# Patient Record
Sex: MALE | Race: WHITE | NOT HISPANIC OR LATINO | Employment: FULL TIME | URBAN - METROPOLITAN AREA
[De-identification: names, ages, dates, MRNs, and addresses within clinical notes are randomized per-mention and may not be internally consistent; named-entity substitution may affect disease eponyms.]

---

## 2017-02-14 ENCOUNTER — LAB VISIT (OUTPATIENT)
Dept: LAB | Facility: HOSPITAL | Age: 52
End: 2017-02-14
Attending: FAMILY MEDICINE
Payer: COMMERCIAL

## 2017-02-14 DIAGNOSIS — R79.89 LOW TESTOSTERONE: ICD-10-CM

## 2017-02-14 DIAGNOSIS — E29.1 TESTICULAR HYPOFUNCTION: ICD-10-CM

## 2017-02-14 DIAGNOSIS — Z91.89 CARDIOVASCULAR RISK FACTOR: ICD-10-CM

## 2017-02-14 DIAGNOSIS — E78.5 HYPERLIPIDEMIA, UNSPECIFIED HYPERLIPIDEMIA TYPE: ICD-10-CM

## 2017-02-14 LAB
ALBUMIN SERPL BCP-MCNC: 3.7 G/DL
ALP SERPL-CCNC: 75 U/L
ALT SERPL W/O P-5'-P-CCNC: 38 U/L
ANION GAP SERPL CALC-SCNC: 11 MMOL/L
AST SERPL-CCNC: 37 U/L
BASOPHILS # BLD AUTO: 0.03 K/UL
BASOPHILS NFR BLD: 0.5 %
BILIRUB SERPL-MCNC: 1.1 MG/DL
BUN SERPL-MCNC: 21 MG/DL
CALCIUM SERPL-MCNC: 9.2 MG/DL
CHLORIDE SERPL-SCNC: 105 MMOL/L
CHOLEST/HDLC SERPL: 5.5 {RATIO}
CO2 SERPL-SCNC: 25 MMOL/L
CREAT SERPL-MCNC: 1.5 MG/DL
DIFFERENTIAL METHOD: ABNORMAL
EOSINOPHIL # BLD AUTO: 0.1 K/UL
EOSINOPHIL NFR BLD: 1.9 %
ERYTHROCYTE [DISTWIDTH] IN BLOOD BY AUTOMATED COUNT: 16.9 %
EST. GFR  (AFRICAN AMERICAN): >60 ML/MIN/1.73 M^2
EST. GFR  (NON AFRICAN AMERICAN): 52.8 ML/MIN/1.73 M^2
GLUCOSE SERPL-MCNC: 111 MG/DL
HCT VFR BLD AUTO: 48.9 %
HDL/CHOLESTEROL RATIO: 18 %
HDLC SERPL-MCNC: 172 MG/DL
HDLC SERPL-MCNC: 31 MG/DL
HGB BLD-MCNC: 15.6 G/DL
LDLC SERPL CALC-MCNC: 111 MG/DL
LYMPHOCYTES # BLD AUTO: 1.5 K/UL
LYMPHOCYTES NFR BLD: 24 %
MCH RBC QN AUTO: 21.5 PG
MCHC RBC AUTO-ENTMCNC: 31.9 %
MCV RBC AUTO: 68 FL
MONOCYTES # BLD AUTO: 0.6 K/UL
MONOCYTES NFR BLD: 8.7 %
NEUTROPHILS # BLD AUTO: 4.2 K/UL
NEUTROPHILS NFR BLD: 64.9 %
NONHDLC SERPL-MCNC: 141 MG/DL
PLATELET # BLD AUTO: 167 K/UL
PMV BLD AUTO: 10.1 FL
POTASSIUM SERPL-SCNC: 4.6 MMOL/L
PROT SERPL-MCNC: 6.7 G/DL
RBC # BLD AUTO: 7.24 M/UL
SODIUM SERPL-SCNC: 141 MMOL/L
TESTOST SERPL-MCNC: 656 NG/DL
TRIGL SERPL-MCNC: 150 MG/DL
WBC # BLD AUTO: 6.42 K/UL

## 2017-02-14 PROCEDURE — 80053 COMPREHEN METABOLIC PANEL: CPT

## 2017-02-14 PROCEDURE — 84403 ASSAY OF TOTAL TESTOSTERONE: CPT

## 2017-02-14 PROCEDURE — 85025 COMPLETE CBC W/AUTO DIFF WBC: CPT

## 2017-02-14 PROCEDURE — 36415 COLL VENOUS BLD VENIPUNCTURE: CPT | Mod: PO

## 2017-02-14 PROCEDURE — 80061 LIPID PANEL: CPT

## 2017-02-21 ENCOUNTER — OFFICE VISIT (OUTPATIENT)
Dept: FAMILY MEDICINE | Facility: CLINIC | Age: 52
End: 2017-02-21
Payer: COMMERCIAL

## 2017-02-21 VITALS
DIASTOLIC BLOOD PRESSURE: 85 MMHG | HEART RATE: 78 BPM | BODY MASS INDEX: 35.03 KG/M2 | HEIGHT: 71 IN | WEIGHT: 250.25 LBS | SYSTOLIC BLOOD PRESSURE: 141 MMHG

## 2017-02-21 DIAGNOSIS — E29.1 TESTICULAR HYPOFUNCTION: ICD-10-CM

## 2017-02-21 DIAGNOSIS — Z76.89 ESTABLISHING CARE WITH NEW DOCTOR, ENCOUNTER FOR: Primary | ICD-10-CM

## 2017-02-21 DIAGNOSIS — E78.5 HYPERLIPIDEMIA, UNSPECIFIED HYPERLIPIDEMIA TYPE: ICD-10-CM

## 2017-02-21 PROCEDURE — 3077F SYST BP >= 140 MM HG: CPT | Mod: S$GLB,,, | Performed by: FAMILY MEDICINE

## 2017-02-21 PROCEDURE — 99999 PR PBB SHADOW E&M-EST. PATIENT-LVL III: CPT | Mod: PBBFAC,,, | Performed by: FAMILY MEDICINE

## 2017-02-21 PROCEDURE — 99213 OFFICE O/P EST LOW 20 MIN: CPT | Mod: S$GLB,,, | Performed by: FAMILY MEDICINE

## 2017-02-21 PROCEDURE — 3079F DIAST BP 80-89 MM HG: CPT | Mod: S$GLB,,, | Performed by: FAMILY MEDICINE

## 2017-02-21 RX ORDER — TESTOSTERONE CYPIONATE 200 MG/ML
INJECTION, SOLUTION INTRAMUSCULAR
Qty: 10 ML | Refills: 4 | Status: SHIPPED | OUTPATIENT
Start: 2017-02-21 | End: 2017-05-22 | Stop reason: SDUPTHER

## 2017-02-21 NOTE — PATIENT INSTRUCTIONS
Weight Management: Getting Started  Healthy bodies come in all shapes and sizes. Not all bodies are made to be thin. For some people, a healthy weight is higher than the average weight listed on weight charts. Your healthcare provider can help you decide on a healthy weight for you.    Reasons to lose weight  Losing weight can help with some health problems, such as high blood pressure, heart disease, diabetes, sleep apnea, and arthritis. You may also feel more energy.  Set your long-term goal  Your goal doesn't even have to be a specific weight. You may decide on a fitness goal (such as being able to walk 10 miles a week), or a health goal (such as lowering your blood pressure). Choose a goal that is measurable and reasonable, so you know when you've reached it. A goal of reaching a BMI of less than 25 is not always reasonable (or possible).   Make an action plan  Habits dont change overnight. Setting your goals too high can leave you feeling discouraged if you cant reach them. Be realistic. Choose one or two small changes you can make now. Set an action plan for how you are going to make these changes. When you can stick to this plan, keep making a few more small changes. Taking small steps will help you stay on the path to success.  Track your progress  Write down your goals. Then, keep a daily record of your progress. Write down what you eat and how active you are. This record lets you look back on how much youve done. It may also help when youre feeling frustrated. Reward yourself for success. Even if you dont reach every goal, give yourself credit for what you do get done.  Get support  Encouragement from others can help make losing weight easier. Ask your family members and friends for support. They may even want to join you. Also look to your healthcare provider, registered dietitian, and  for help. Your local hospital can give you more information about nutrition, exercise, and  weight loss.  Date Last Reviewed: 1/31/2016  © 0009-4377 [employer], [email or web address] [address] [place] [address], [place] [address]. All rights reserved. This information is not intended as a substitute for professional medical care. Always follow your healthcare professional's instructions.

## 2017-02-21 NOTE — MEDICAL/APP STUDENT
Subjective:       Patient ID: Enrico Talbot is a 51 y.o. male.    Chief Complaint: Establish Care    HPI     Establish Care  Pt reports to the clinic to establish care.   The pt has a medical history which includes, hyperlipidemia, low testerone, family history of CAD  As far as smoking is concerned, the pt is non smoker.  Patient abstains from alcohol.  The pt attempts to maintain a healthy diet and engages in regular exercise.   Consistent seatbelt usage reported.   Pt has no symptoms of depression.   Health maintenance addressed and updated in chart.    Patient follows up every 3 months for testosterone levels    Most recent colonoscopy in 2015. NAD   Last Tdap in 2009      Review of Systems   Constitutional: Negative for chills and fever.   HENT: Negative for congestion and sore throat.    Eyes: Negative for visual disturbance.   Respiratory: Negative for chest tightness and shortness of breath.    Cardiovascular: Negative for chest pain and leg swelling.   Gastrointestinal: Negative for abdominal pain and blood in stool.   Genitourinary: Negative for difficulty urinating, dysuria and hematuria.   Musculoskeletal: Positive for arthralgias. Negative for back pain.   Neurological: Negative for dizziness, light-headedness and headaches.       Objective:      Physical Exam   Constitutional: He is oriented to person, place, and time. He appears well-developed and well-nourished.   HENT:   Mouth/Throat: Oropharynx is clear and moist.   Eyes: EOM are normal. Pupils are equal, round, and reactive to light.   Neck: No thyromegaly present.   Cardiovascular: Regular rhythm and normal heart sounds.    Pulmonary/Chest: Effort normal and breath sounds normal.   Abdominal: Soft. Bowel sounds are normal.   Lymphadenopathy:     He has no cervical adenopathy.   Neurological: He is alert and oriented to person, place, and time. He has normal reflexes.   Skin: Skin is warm and dry.       Assessment:     Enrico Talbot is  a 52 yo male presenting to establish care.    Plan:     1. Establishing care with new doctor, encounter for    2. Testicular hypofunction  - Testosterone cypionate (DEPOTESTOTERONE CYPIONATE) 200 mg/mL injection; Inject 0.35 mL IM twice weekly.  Dispense: 10 mL; Refill: 4  - Provided with ambulatory referral to Endocrinology     3. Hyperlipidemia, unspecified hyperlipidemia type  - Advised to continue fish oil-omega-3 fatty acids 300-1,000 mg capsule Take 4 g by mouth once daily    4. BMI 34.0-34.9,adult  - Counseled on the importance of a balanced diet and regular exercise

## 2017-02-21 NOTE — PROGRESS NOTES
Subjective:        Patient ID: Enrico Talbot is a 51 y.o. male.     Chief Complaint: Establish Care     HPI      Establish Care  Pt reports to the clinic to establish care.   The pt has a medical history which includes, hyperlipidemia, low testerone, family history of CAD  As far as smoking is concerned, the pt is non smoker.  Patient abstains from alcohol.  The pt attempts to maintain a healthy diet and engages in regular exercise.   Consistent seatbelt usage reported.   Pt has no symptoms of depression.   Health maintenance addressed and updated in chart.     Patient follows up every 3 months for testosterone levels     Most recent colonoscopy in 2015. NAD   Last Tdap in 2009        Review of Systems   Constitutional: Negative for chills and fever.   HENT: Negative for congestion and sore throat.   Eyes: Negative for visual disturbance.   Respiratory: Negative for chest tightness and shortness of breath.   Cardiovascular: Negative for chest pain and leg swelling.   Gastrointestinal: Negative for abdominal pain and blood in stool.   Genitourinary: Negative for difficulty urinating, dysuria and hematuria.   Musculoskeletal: Positive for arthralgias. Negative for back pain.   Neurological: Negative for dizziness, light-headedness and headaches.       Objective:      Physical Exam   Constitutional: He is oriented to person, place, and time. He appears well-developed and well-nourished.   HENT:   Mouth/Throat: Oropharynx is clear and moist.   Eyes: EOM are normal. Pupils are equal, round, and reactive to light.   Neck: No thyromegaly present.   Cardiovascular: Regular rhythm and normal heart sounds.   Pulmonary/Chest: Effort normal and breath sounds normal.   Abdominal: Soft. Bowel sounds are normal.   Lymphadenopathy:   He has no cervical adenopathy.   Neurological: He is alert and oriented to person, place, and time. He has normal reflexes.   Skin: Skin is warm and dry.       Assessment:      Enrico  Antione is a 50 yo male presenting to establish care.     Plan:      1. Establishing care with new doctor, encounter for     2. Testicular hypofunction  - Testosterone cypionate (DEPOTESTOTERONE CYPIONATE) 200 mg/mL injection; Inject 0.35 mL IM twice weekly. Dispense: 10 mL; Refill: 4  - Provided with ambulatory referral to Endocrinology      3. Hyperlipidemia, unspecified hyperlipidemia type  - Advised to continue fish oil-omega-3 fatty acids 300-1,000 mg capsule Take 4 g by mouth once daily     4. BMI 34.0-34.9,adult  - Counseled on the importance of a balanced diet and regular exercise      Portions of this note were created using Dragon voice recognition software. There may be voice recognition errors found in the text, and attempts were made to correct these errors prior to signature    Fidel Phelan MD    Family Medicine  2/21/2017

## 2017-05-22 ENCOUNTER — OFFICE VISIT (OUTPATIENT)
Dept: FAMILY MEDICINE | Facility: CLINIC | Age: 52
End: 2017-05-22
Payer: COMMERCIAL

## 2017-05-22 VITALS
BODY MASS INDEX: 34.48 KG/M2 | SYSTOLIC BLOOD PRESSURE: 136 MMHG | HEART RATE: 73 BPM | HEIGHT: 71 IN | DIASTOLIC BLOOD PRESSURE: 92 MMHG | WEIGHT: 246.25 LBS

## 2017-05-22 DIAGNOSIS — E29.1 TESTICULAR HYPOFUNCTION: ICD-10-CM

## 2017-05-22 DIAGNOSIS — E78.5 HYPERLIPIDEMIA, UNSPECIFIED HYPERLIPIDEMIA TYPE: Primary | ICD-10-CM

## 2017-05-22 PROCEDURE — 3080F DIAST BP >= 90 MM HG: CPT | Mod: S$GLB,,, | Performed by: FAMILY MEDICINE

## 2017-05-22 PROCEDURE — 1160F RVW MEDS BY RX/DR IN RCRD: CPT | Mod: S$GLB,,, | Performed by: FAMILY MEDICINE

## 2017-05-22 PROCEDURE — 99213 OFFICE O/P EST LOW 20 MIN: CPT | Mod: S$GLB,,, | Performed by: FAMILY MEDICINE

## 2017-05-22 PROCEDURE — 99999 PR PBB SHADOW E&M-EST. PATIENT-LVL III: CPT | Mod: PBBFAC,,, | Performed by: FAMILY MEDICINE

## 2017-05-22 PROCEDURE — 3075F SYST BP GE 130 - 139MM HG: CPT | Mod: S$GLB,,, | Performed by: FAMILY MEDICINE

## 2017-05-22 RX ORDER — TESTOSTERONE CYPIONATE 200 MG/ML
INJECTION, SOLUTION INTRAMUSCULAR
Qty: 10 ML | Refills: 4 | Status: SHIPPED | OUTPATIENT
Start: 2017-05-22 | End: 2017-08-22 | Stop reason: SDUPTHER

## 2017-05-22 NOTE — PROGRESS NOTES
Subjective:       Patient ID: Enrico Talbot is a 52 y.o. male.    Chief Complaint: Follow-up (3month)    HPI     Follow up  Pt is here to follow up multiple chronic medical conditions.   Pt does reports compliance with medications.   The pt has a current PMH of HLD.  As it relates to exercise, the pt reports that he has been exercising.  As far as smoking is concerned, the pt denies.  Pt has been making attempts at eating healthy.  Health maintenance addressed and updated in chart.    Review of Systems   Constitutional: Negative for chills and fever.   Respiratory: Negative for chest tightness and shortness of breath.    Cardiovascular: Negative for chest pain and leg swelling.   Gastrointestinal: Negative for abdominal pain, constipation, diarrhea and vomiting.   Genitourinary: Negative for decreased urine volume, dysuria and hematuria.   Musculoskeletal: Negative for arthralgias.   Neurological: Negative for weakness and light-headedness.       Objective:      Physical Exam   Constitutional: He appears well-developed and well-nourished. No distress.   HENT:   Head: Normocephalic and atraumatic.   Mouth/Throat: Oropharynx is clear and moist. No oropharyngeal exudate.   Eyes: EOM are normal. Pupils are equal, round, and reactive to light.   Neck: Normal range of motion. Neck supple. No thyromegaly present.   Cardiovascular: Normal rate, regular rhythm, normal heart sounds and intact distal pulses.    Pulmonary/Chest: Effort normal and breath sounds normal. No respiratory distress. He has no wheezes.   Abdominal: Soft. Bowel sounds are normal. He exhibits no distension and no mass. There is no tenderness.   Musculoskeletal: He exhibits no edema.   Lymphadenopathy:     He has no cervical adenopathy.   Neurological: He is alert.   Skin: Skin is warm. No rash noted. No erythema.   Psychiatric: He has a normal mood and affect. His behavior is normal.   Vitals reviewed.      Assessment:       1. Hyperlipidemia,  unspecified hyperlipidemia type    2. BMI 34.0-34.9,adult        Plan:       1. Hyperlipidemia, unspecified hyperlipidemia type  Consider starting lipitor at next visit.   Pt will continue on CoQ 10.    2. BMI 34.0-34.9,adult  Patient has been advised to continue to maintain a healthy lifestyle, including regular exercise and consuming a well balanced diet.     Patient readiness: acceptance and barriers:none    During the course of the visit the patient was educated and counseled about the following:     Obesity:   Informal exercise measures discussed, e.g. taking stairs instead of elevator.  Regular aerobic exercise program discussed.    Goals: Obesity: Reduce calorie intake and BMI    Did patient meet goals/outcomes: No    The following self management tools provided: excercise log    Patient Instructions (the written plan) was given to the patient/family.     Time spent with patient: 15 minutes    Portions of this note were created using Dragon voice recognition software. There may be voice recognition errors found in the text, and attempts were made to correct these errors prior to signature    Fidel Phelan MD    Family Medicine  5/22/2017

## 2017-05-22 NOTE — PATIENT INSTRUCTIONS
Weight Management: Getting Started  Healthy bodies come in all shapes and sizes. Not all bodies are made to be thin. For some people, a healthy weight is higher than the average weight listed on weight charts. Your healthcare provider can help you decide on a healthy weight for you.    Reasons to lose weight  Losing weight can help with some health problems, such as high blood pressure, heart disease, diabetes, sleep apnea, and arthritis. You may also feel more energy.  Set your long-term goal  Your goal doesn't even have to be a specific weight. You may decide on a fitness goal (such as being able to walk 10 miles a week), or a health goal (such as lowering your blood pressure). Choose a goal that is measurable and reasonable, so you know when you've reached it. A goal of reaching a BMI of less than 25 is not always reasonable (or possible).   Make an action plan  Habits dont change overnight. Setting your goals too high can leave you feeling discouraged if you cant reach them. Be realistic. Choose one or two small changes you can make now. Set an action plan for how you are going to make these changes. When you can stick to this plan, keep making a few more small changes. Taking small steps will help you stay on the path to success.  Track your progress  Write down your goals. Then, keep a daily record of your progress. Write down what you eat and how active you are. This record lets you look back on how much youve done. It may also help when youre feeling frustrated. Reward yourself for success. Even if you dont reach every goal, give yourself credit for what you do get done.  Get support  Encouragement from others can help make losing weight easier. Ask your family members and friends for support. They may even want to join you. Also look to your healthcare provider, registered dietitian, and  for help. Your local hospital can give you more information about nutrition, exercise, and  weight loss.  Date Last Reviewed: 1/31/2016  © 4820-3588 [employer], [employer] [address] [place] [address], [place] [address]. All rights reserved. This information is not intended as a substitute for professional medical care. Always follow your healthcare professional's instructions.

## 2017-08-22 ENCOUNTER — OFFICE VISIT (OUTPATIENT)
Dept: FAMILY MEDICINE | Facility: CLINIC | Age: 52
End: 2017-08-22
Payer: COMMERCIAL

## 2017-08-22 ENCOUNTER — DOCUMENTATION ONLY (OUTPATIENT)
Dept: FAMILY MEDICINE | Facility: CLINIC | Age: 52
End: 2017-08-22

## 2017-08-22 VITALS
HEART RATE: 79 BPM | DIASTOLIC BLOOD PRESSURE: 81 MMHG | WEIGHT: 244.94 LBS | BODY MASS INDEX: 34.29 KG/M2 | HEIGHT: 71 IN | SYSTOLIC BLOOD PRESSURE: 137 MMHG

## 2017-08-22 DIAGNOSIS — E78.5 HYPERLIPIDEMIA, UNSPECIFIED HYPERLIPIDEMIA TYPE: Primary | ICD-10-CM

## 2017-08-22 DIAGNOSIS — R79.89 LOW TESTOSTERONE: ICD-10-CM

## 2017-08-22 DIAGNOSIS — E29.1 TESTICULAR HYPOFUNCTION: ICD-10-CM

## 2017-08-22 PROCEDURE — 3079F DIAST BP 80-89 MM HG: CPT | Mod: S$GLB,,, | Performed by: FAMILY MEDICINE

## 2017-08-22 PROCEDURE — 3008F BODY MASS INDEX DOCD: CPT | Mod: S$GLB,,, | Performed by: FAMILY MEDICINE

## 2017-08-22 PROCEDURE — 3075F SYST BP GE 130 - 139MM HG: CPT | Mod: S$GLB,,, | Performed by: FAMILY MEDICINE

## 2017-08-22 PROCEDURE — 99999 PR PBB SHADOW E&M-EST. PATIENT-LVL III: CPT | Mod: PBBFAC,,, | Performed by: FAMILY MEDICINE

## 2017-08-22 PROCEDURE — 99214 OFFICE O/P EST MOD 30 MIN: CPT | Mod: S$GLB,,, | Performed by: FAMILY MEDICINE

## 2017-08-22 RX ORDER — TESTOSTERONE CYPIONATE 200 MG/ML
INJECTION, SOLUTION INTRAMUSCULAR
Qty: 10 ML | Refills: 4 | Status: SHIPPED | OUTPATIENT
Start: 2017-08-22 | End: 2018-02-23 | Stop reason: SDUPTHER

## 2017-08-22 NOTE — PATIENT INSTRUCTIONS
Weight Management: Getting Started  Healthy bodies come in all shapes and sizes. Not all bodies are made to be thin. For some people, a healthy weight is higher than the average weight listed on weight charts. Your healthcare provider can help you decide on a healthy weight for you.    Reasons to lose weight  Losing weight can help with some health problems, such as high blood pressure, heart disease, diabetes, sleep apnea, and arthritis. You may also feel more energy.  Set your long-term goal  Your goal doesn't even have to be a specific weight. You may decide on a fitness goal (such as being able to walk 10 miles a week), or a health goal (such as lowering your blood pressure). Choose a goal that is measurable and reasonable, so you know when you've reached it. A goal of reaching a BMI of less than 25 is not always reasonable (or possible).   Make an action plan  Habits dont change overnight. Setting your goals too high can leave you feeling discouraged if you cant reach them. Be realistic. Choose one or two small changes you can make now. Set an action plan for how you are going to make these changes. When you can stick to this plan, keep making a few more small changes. Taking small steps will help you stay on the path to success.  Track your progress  Write down your goals. Then, keep a daily record of your progress. Write down what you eat and how active you are. This record lets you look back on how much youve done. It may also help when youre feeling frustrated. Reward yourself for success. Even if you dont reach every goal, give yourself credit for what you do get done.  Get support  Encouragement from others can help make losing weight easier. Ask your family members and friends for support. They may even want to join you. Also look to your healthcare provider, registered dietitian, and  for help. Your local hospital can give you more information about nutrition, exercise, and  weight loss.  Date Last Reviewed: 1/31/2016  © 7543-2692 [employer], [employer]. [address] [place] [address], [place] [address]. All rights reserved. This information is not intended as a substitute for professional medical care. Always follow your healthcare professional's instructions.

## 2017-08-22 NOTE — PROGRESS NOTES
Subjective:       Patient ID: Enrico Talbot is a 52 y.o. male.    Chief Complaint: Follow-up    HPI     Follow up  Pt is here to follow up multiple chronic medical conditions.   Pt does reports compliance with medications.   The pt has a current PMH of HLD and low T..  As it relates to exercise, the pt reports that he routinely exercises.   As far as smoking is concerned, the pt denies.   Pt has been making attempts at eating healthy.  Health maintenance addressed and updated in chart.    Review of Systems   Constitutional: Negative for activity change, chills, fever and unexpected weight change.   HENT: Negative for hearing loss, rhinorrhea and trouble swallowing.    Eyes: Negative for discharge and visual disturbance.   Respiratory: Negative for chest tightness, shortness of breath and wheezing.    Cardiovascular: Negative for chest pain, palpitations and leg swelling.   Gastrointestinal: Negative for abdominal pain, blood in stool, constipation, diarrhea and vomiting.   Endocrine: Negative for polydipsia and polyuria.   Genitourinary: Negative for decreased urine volume, difficulty urinating, dysuria, hematuria and urgency.   Musculoskeletal: Negative for arthralgias, joint swelling and neck pain.   Neurological: Negative for weakness, light-headedness and headaches.   Psychiatric/Behavioral: Negative for confusion and dysphoric mood.       Objective:      Physical Exam   Constitutional: He appears well-developed and well-nourished. No distress.   HENT:   Head: Normocephalic and atraumatic.   Mouth/Throat: Oropharynx is clear and moist. No oropharyngeal exudate.   Eyes: EOM are normal. Pupils are equal, round, and reactive to light.   Neck: Normal range of motion. Neck supple. No thyromegaly present.   Cardiovascular: Normal rate, regular rhythm, normal heart sounds and intact distal pulses.    Pulmonary/Chest: Effort normal and breath sounds normal. No respiratory distress. He has no wheezes.    Abdominal: Soft. Bowel sounds are normal. He exhibits no distension and no mass. There is no tenderness.   Musculoskeletal: He exhibits no edema.   Lymphadenopathy:     He has no cervical adenopathy.   Neurological: He is alert.   Skin: Skin is warm. No rash noted. No erythema.   Psychiatric: He has a normal mood and affect. His behavior is normal.   Vitals reviewed.      Assessment:       1. Hyperlipidemia, unspecified hyperlipidemia type    2. Testicular hypofunction    3. Low testosterone    4. BMI 34.0-34.9,adult        Plan:       1. Testicular hypofunction  - testosterone cypionate (DEPOTESTOTERONE CYPIONATE) 200 mg/mL injection; Inject 0.35 mL IM twice weekly.  Dispense: 10 mL; Refill: 4    2. Hyperlipidemia, unspecified hyperlipidemia type  The 10-year ASCVD risk score (Janene STOREY Jr., et al., 2013) is: 6.8%    Values used to calculate the score:      Age: 52 years      Sex: Male      Is Non- : No      Diabetic: No      Tobacco smoker: No      Systolic Blood Pressure: 137 mmHg      Is BP treated: No      HDL Cholesterol: 31 mg/dL      Total Cholesterol: 172 mg/dL    3. Low testosterone  Continue replacement.   Pt understands risks associated with meds.     4. BMI 34.0-34.9,adult  Patient has been advised to continue to maintain a healthy lifestyle, including regular exercise and consuming a well balanced diet.     Patient readiness: acceptance and barriers:none    During the course of the visit the patient was educated and counseled about the following:     Obesity:   Informal exercise measures discussed, e.g. taking stairs instead of elevator.  Regular aerobic exercise program discussed.    Goals: Obesity: Reduce calorie intake and BMI    Did patient meet goals/outcomes: no    The following self management tools provided: excercise log    Patient Instructions (the written plan) was given to the patient/family.     Time spent with patient: 15 minutes    Portions of this note were  created using Dragon voice recognition software. There may be voice recognition errors found in the text, and attempts were made to correct these errors prior to signature    Fidel Phelan MD    Family Medicine  8/22/2017

## 2017-08-22 NOTE — PROGRESS NOTES
Pre-Visit Chart Review  For Appointment Scheduled on (8/22)    Health Maintenance Due   Topic Date Due    Influenza Vaccine  08/01/2017

## 2018-02-23 ENCOUNTER — DOCUMENTATION ONLY (OUTPATIENT)
Dept: FAMILY MEDICINE | Facility: CLINIC | Age: 53
End: 2018-02-23

## 2018-02-23 ENCOUNTER — LAB VISIT (OUTPATIENT)
Dept: LAB | Facility: HOSPITAL | Age: 53
End: 2018-02-23
Attending: FAMILY MEDICINE
Payer: COMMERCIAL

## 2018-02-23 ENCOUNTER — OFFICE VISIT (OUTPATIENT)
Dept: FAMILY MEDICINE | Facility: CLINIC | Age: 53
End: 2018-02-23
Payer: COMMERCIAL

## 2018-02-23 VITALS
OXYGEN SATURATION: 99 % | BODY MASS INDEX: 34.01 KG/M2 | HEIGHT: 71 IN | HEART RATE: 66 BPM | SYSTOLIC BLOOD PRESSURE: 120 MMHG | DIASTOLIC BLOOD PRESSURE: 82 MMHG | WEIGHT: 242.94 LBS

## 2018-02-23 DIAGNOSIS — E78.5 HYPERLIPIDEMIA, UNSPECIFIED HYPERLIPIDEMIA TYPE: ICD-10-CM

## 2018-02-23 DIAGNOSIS — R79.89 LOW TESTOSTERONE: ICD-10-CM

## 2018-02-23 DIAGNOSIS — E29.1 TESTICULAR HYPOFUNCTION: ICD-10-CM

## 2018-02-23 DIAGNOSIS — Z00.00 WELLNESS EXAMINATION: ICD-10-CM

## 2018-02-23 DIAGNOSIS — Z00.00 WELLNESS EXAMINATION: Primary | ICD-10-CM

## 2018-02-23 LAB
ANION GAP SERPL CALC-SCNC: 8 MMOL/L
BUN SERPL-MCNC: 18 MG/DL
CALCIUM SERPL-MCNC: 9.2 MG/DL
CHLORIDE SERPL-SCNC: 103 MMOL/L
CHOLEST SERPL-MCNC: 142 MG/DL
CHOLEST/HDLC SERPL: 4.9 {RATIO}
CO2 SERPL-SCNC: 28 MMOL/L
CREAT SERPL-MCNC: 1.3 MG/DL
EST. GFR  (AFRICAN AMERICAN): >60 ML/MIN/1.73 M^2
EST. GFR  (NON AFRICAN AMERICAN): >60 ML/MIN/1.73 M^2
GLUCOSE SERPL-MCNC: 103 MG/DL
HDLC SERPL-MCNC: 29 MG/DL
HDLC SERPL: 20.4 %
LDLC SERPL CALC-MCNC: 93 MG/DL
NONHDLC SERPL-MCNC: 113 MG/DL
POTASSIUM SERPL-SCNC: 4.4 MMOL/L
PROSTATE SPECIFIC ANTIGEN, TOTAL: 0.78 NG/ML
PSA FREE MFR SERPL: 35.9 %
PSA FREE SERPL-MCNC: 0.28 NG/ML
SODIUM SERPL-SCNC: 139 MMOL/L
TESTOST SERPL-MCNC: 991 NG/DL
TRIGL SERPL-MCNC: 100 MG/DL

## 2018-02-23 PROCEDURE — 84154 ASSAY OF PSA FREE: CPT

## 2018-02-23 PROCEDURE — 84403 ASSAY OF TOTAL TESTOSTERONE: CPT

## 2018-02-23 PROCEDURE — 80048 BASIC METABOLIC PNL TOTAL CA: CPT

## 2018-02-23 PROCEDURE — 36415 COLL VENOUS BLD VENIPUNCTURE: CPT | Mod: PO

## 2018-02-23 PROCEDURE — 99999 PR PBB SHADOW E&M-EST. PATIENT-LVL III: CPT | Mod: PBBFAC,,, | Performed by: FAMILY MEDICINE

## 2018-02-23 PROCEDURE — 80061 LIPID PANEL: CPT

## 2018-02-23 PROCEDURE — 99396 PREV VISIT EST AGE 40-64: CPT | Mod: S$GLB,,, | Performed by: FAMILY MEDICINE

## 2018-02-23 RX ORDER — TESTOSTERONE CYPIONATE 200 MG/ML
INJECTION, SOLUTION INTRAMUSCULAR
Qty: 10 ML | Refills: 4 | Status: SHIPPED | OUTPATIENT
Start: 2018-02-23 | End: 2021-02-08

## 2018-02-23 NOTE — PROGRESS NOTES
Pre-Visit Chart Review  For Appointment Scheduled on (2/23    Health Maintenance Due   Topic Date Due    Influenza Vaccine  08/01/2017

## 2018-02-23 NOTE — PROGRESS NOTES
Subjective:       Patient ID: Enrico Talbot is a 52 y.o. male.    Chief Complaint: Annual Exam    HPI     Annual Exam  Pt reports to the clinic for a wellness exam.   Currently, pt is without complaint.   The pt has a medical history which includes HLD and low T.  As far as smoking is concerned, the pt denies.   The pt attempts to maintain a healthy diet and engages in regular exercise.   Consistent seatbelt usage reported.   Pt has no symptoms of depression.     Review of Systems   Constitutional: Negative for chills and fever.   Respiratory: Negative for chest tightness and shortness of breath.    Cardiovascular: Negative for chest pain and leg swelling.   Gastrointestinal: Negative for abdominal pain, constipation, diarrhea and vomiting.   Genitourinary: Negative for decreased urine volume, dysuria and hematuria.   Musculoskeletal: Negative for arthralgias and back pain.   Neurological: Negative for weakness and light-headedness.       Objective:      Physical Exam   Constitutional: He appears well-developed and well-nourished. No distress.   Obese male in no acute distress.     HENT:   Head: Normocephalic and atraumatic.   Mouth/Throat: Oropharynx is clear and moist. No oropharyngeal exudate.   Eyes: EOM are normal. Pupils are equal, round, and reactive to light.   Neck: Normal range of motion. Neck supple. No thyromegaly present.   Cardiovascular: Normal rate, regular rhythm, normal heart sounds and intact distal pulses.    Pulmonary/Chest: Effort normal and breath sounds normal. No respiratory distress. He has no wheezes.   Abdominal: Soft. Bowel sounds are normal. He exhibits no distension and no mass. There is no tenderness.   Musculoskeletal: He exhibits no edema.   Lymphadenopathy:     He has no cervical adenopathy.   Neurological: He is alert.   Skin: Skin is warm. No rash noted. No erythema.   Psychiatric: He has a normal mood and affect. His behavior is normal.   Vitals reviewed.       Assessment:       1. Wellness examination    2. Hyperlipidemia, unspecified hyperlipidemia type    3. BMI 33.0-33.9,adult    4. Low testosterone        Plan:       1. Wellness examination  - Basic metabolic panel; Future  - Microalbumin/creatinine urine ratio; Future  - Lipid panel; Future    2. Hyperlipidemia, unspecified hyperlipidemia type  The 10-year ASCVD risk score (Janene STOREY Jr., et al., 2013) is: 5.4%    Values used to calculate the score:      Age: 52 years      Sex: Male      Is Non- : No      Diabetic: No      Tobacco smoker: No      Systolic Blood Pressure: 120 mmHg      Is BP treated: No      HDL Cholesterol: 31 mg/dL      Total Cholesterol: 172 mg/dL  - Lipid panel; Future    3. BMI 33.0-33.9,adult  Patient has been advised to continue to maintain a healthy lifestyle, including regular exercise and consuming a well balanced diet.     4. Low testosterone  - testosterone cypionate (DEPOTESTOTERONE CYPIONATE) 200 mg/mL injection; Inject 0.35 mL IM twice weekly.  Dispense: 10 mL; Refill: 4  - Testosterone; Future  - PSA, total and free; Future    5. Testicular hypofunction  - testosterone cypionate (DEPOTESTOTERONE CYPIONATE) 200 mg/mL injection; Inject 0.35 mL IM twice weekly.  Dispense: 10 mL; Refill: 4  - PSA, total and free; Future    Patient readiness: acceptance and barriers:none    During the course of the visit the patient was educated and counseled about the following:     Obesity:   Informal exercise measures discussed, e.g. taking stairs instead of elevator.  Regular aerobic exercise program discussed.    Goals: Obesity: Reduce calorie intake and BMI    Did patient meet goals/outcomes: No    The following self management tools provided: excercise log    Patient Instructions (the written plan) was given to the patient/family.     Time spent with patient: 15 minutes    Barriers to medications present (no )    Adverse reactions to current medications (no)    Over the  counter medications reviewed (Yes)    Portions of this note were created using Dragon voice recognition software. There may be voice recognition errors found in the text, and attempts were made to correct these errors prior to signature    Fidel Phelan MD    Family Medicine  2/23/2018

## 2018-02-27 ENCOUNTER — TELEPHONE (OUTPATIENT)
Dept: FAMILY MEDICINE | Facility: CLINIC | Age: 53
End: 2018-02-27

## 2018-02-27 DIAGNOSIS — R80.9 MICROALBUMINURIA: ICD-10-CM

## 2018-02-27 NOTE — TELEPHONE ENCOUNTER
I left a message on the patient's voicemail indicating that he has evidence of microalbuminuria noted on recent labs.  Also notify patient that renal function has improved to baseline and that we will continue to monitor microalbuminuria with labs.

## 2018-08-17 ENCOUNTER — TELEPHONE (OUTPATIENT)
Dept: FAMILY MEDICINE | Facility: CLINIC | Age: 53
End: 2018-08-17

## 2018-08-17 ENCOUNTER — PATIENT MESSAGE (OUTPATIENT)
Dept: FAMILY MEDICINE | Facility: CLINIC | Age: 53
End: 2018-08-17

## 2018-08-17 NOTE — TELEPHONE ENCOUNTER
Called patient regarding appointment on Friday 8/24/189, no answer left detail voice message. Message sent to patient through patient portal.

## 2019-08-02 ENCOUNTER — OFFICE VISIT (OUTPATIENT)
Dept: FAMILY MEDICINE | Facility: CLINIC | Age: 54
End: 2019-08-02
Payer: COMMERCIAL

## 2019-08-02 VITALS
HEIGHT: 71 IN | SYSTOLIC BLOOD PRESSURE: 148 MMHG | DIASTOLIC BLOOD PRESSURE: 98 MMHG | TEMPERATURE: 98 F | WEIGHT: 248.69 LBS | HEART RATE: 63 BPM | BODY MASS INDEX: 34.81 KG/M2

## 2019-08-02 DIAGNOSIS — R03.0 ELEVATED BP WITHOUT DIAGNOSIS OF HYPERTENSION: ICD-10-CM

## 2019-08-02 DIAGNOSIS — R06.83 SNORING: ICD-10-CM

## 2019-08-02 DIAGNOSIS — R40.0 HAS DAYTIME DROWSINESS: Primary | ICD-10-CM

## 2019-08-02 PROCEDURE — 99999 PR PBB SHADOW E&M-EST. PATIENT-LVL IV: CPT | Mod: PBBFAC,,, | Performed by: NURSE PRACTITIONER

## 2019-08-02 PROCEDURE — 99214 OFFICE O/P EST MOD 30 MIN: CPT | Mod: S$GLB,,, | Performed by: NURSE PRACTITIONER

## 2019-08-02 PROCEDURE — 3008F PR BODY MASS INDEX (BMI) DOCUMENTED: ICD-10-PCS | Mod: CPTII,S$GLB,, | Performed by: NURSE PRACTITIONER

## 2019-08-02 PROCEDURE — 99214 PR OFFICE/OUTPT VISIT, EST, LEVL IV, 30-39 MIN: ICD-10-PCS | Mod: S$GLB,,, | Performed by: NURSE PRACTITIONER

## 2019-08-02 PROCEDURE — 3008F BODY MASS INDEX DOCD: CPT | Mod: CPTII,S$GLB,, | Performed by: NURSE PRACTITIONER

## 2019-08-02 PROCEDURE — 99999 PR PBB SHADOW E&M-EST. PATIENT-LVL IV: ICD-10-PCS | Mod: PBBFAC,,, | Performed by: NURSE PRACTITIONER

## 2019-08-02 NOTE — PROGRESS NOTES
Subjective:       Patient ID: Enrico Talbot is a 54 y.o. male.    Chief Complaint: sleep study refferal    Mr. Talbot presents to the clinic today for sleep problem.  He does not feel rested when he wakes up.  He feels sleepy during the day.  He snores at night; no witnessed apnea.  Today his BP is elevated and he reports it is never this high. He does check BP at home. He ate a salty meal last night.  He does see a primary care NP and has labs regularly.       Review of Systems   Constitutional: Positive for fatigue. Negative for chills and fever.   Respiratory: Negative for cough and shortness of breath.    Cardiovascular: Negative for chest pain and leg swelling.   Neurological: Negative for dizziness and headaches.       Objective:      Physical Exam   Constitutional: He is oriented to person, place, and time. He appears well-developed and well-nourished. No distress.   HENT:   Head: Normocephalic and atraumatic.   Right Ear: External ear normal.   Left Ear: External ear normal.   Mouth/Throat: Oropharynx is clear and moist. No oropharyngeal exudate.   Eyes: Pupils are equal, round, and reactive to light. Right eye exhibits no discharge. Left eye exhibits no discharge.   Neck: Neck supple. No thyromegaly present.   Cardiovascular: Normal rate and regular rhythm. Exam reveals no gallop and no friction rub.   No murmur heard.  Pulmonary/Chest: Effort normal and breath sounds normal. No respiratory distress. He has no wheezes. He has no rales.   Abdominal: Soft. He exhibits no distension. There is no tenderness.   Lymphadenopathy:     He has no cervical adenopathy.   Neurological: He is alert and oriented to person, place, and time. Coordination normal.   Skin: Skin is warm and dry.   Psychiatric: He has a normal mood and affect. His behavior is normal. Thought content normal.   Vitals reviewed.          Current Outpatient Medications:     ascorbic acid (VITAMIN C) 1000 MG tablet, Take 5,000 mg by  mouth once daily.  , Disp: , Rfl:     coenzyme Q10 (CO Q-10) 200 mg capsule, Take 200 mg by mouth once daily.  , Disp: , Rfl:     fish oil-omega-3 fatty acids 300-1,000 mg capsule, Take 4 g by mouth once daily.  , Disp: , Rfl:     lecithin 1,200 mg Chew, Take 1 tablet by mouth once daily.  , Disp: , Rfl:     MV-MN/FA/LYCOPENE/LUT/HB#178 (LILLI MULTIVITAMIN FOR MEN ORAL), Take 2 tablets by mouth once daily.  , Disp: , Rfl:     testosterone cypionate (DEPOTESTOTERONE CYPIONATE) 200 mg/mL injection, Inject 0.35 mL IM twice weekly., Disp: 10 mL, Rfl: 4    ARGININE, L-ARGININE, ORAL, Take by mouth., Disp: , Rfl:     DIET SUPP#21/CALCIUM PHOSPHATE (PRONUTRIENTS FRUIT-VEGGIE ORAL), Take 1 capsule by mouth once daily.  , Disp: , Rfl:     IRON, FERROUS SULFATE, ORAL, Take by mouth once daily., Disp: , Rfl:   Assessment:       1. Has daytime drowsiness    2. Snoring    3. Elevated BP without diagnosis of hypertension        Plan:   Enrico was seen today for sleep study refferal.    Diagnoses and all orders for this visit:    Has daytime drowsiness  -     Ambulatory referral to Sleep Disorders    Snoring  -     Ambulatory referral to Sleep Disorders    Elevated BP without diagnosis of hypertension  BP log daily, RTC with log 4 weeks nurse BP check

## 2019-08-30 ENCOUNTER — CLINICAL SUPPORT (OUTPATIENT)
Dept: FAMILY MEDICINE | Facility: CLINIC | Age: 54
End: 2019-08-30
Payer: COMMERCIAL

## 2019-08-30 VITALS — DIASTOLIC BLOOD PRESSURE: 84 MMHG | SYSTOLIC BLOOD PRESSURE: 130 MMHG | HEART RATE: 86 BPM

## 2019-08-30 DIAGNOSIS — Z01.30 BP CHECK: Primary | ICD-10-CM

## 2019-08-30 PROCEDURE — 99999 PR PBB SHADOW E&M-EST. PATIENT-LVL II: CPT | Mod: PBBFAC,,,

## 2019-08-30 PROCEDURE — 99999 PR PBB SHADOW E&M-EST. PATIENT-LVL II: ICD-10-PCS | Mod: PBBFAC,,,

## 2019-08-30 NOTE — PROGRESS NOTES
Patient was told to come back in 4 weeks for a BP check and continue to check BP at home and bring in his log when he returned. Patient will bring the log back or send it through his portal he forgot it today. BP today is 130/84 Pulse is 84. Patient has no complaints today and feeling well was told to come back for a visit and establish with a provider he stated they put him with Dr. Holcomb and he would be following up with her after the sleep study was completed that Alisa wanted him to get.

## 2019-09-16 DIAGNOSIS — R06.83 SNORING: ICD-10-CM

## 2019-09-16 DIAGNOSIS — R53.83 FATIGUE DUE TO SLEEP PATTERN DISTURBANCE: ICD-10-CM

## 2019-09-16 DIAGNOSIS — G47.19 EXCESSIVE DAYTIME SLEEPINESS: ICD-10-CM

## 2019-09-16 DIAGNOSIS — G47.33 OSA (OBSTRUCTIVE SLEEP APNEA): Primary | ICD-10-CM

## 2019-09-16 DIAGNOSIS — G47.9 FATIGUE DUE TO SLEEP PATTERN DISTURBANCE: ICD-10-CM

## 2019-10-07 ENCOUNTER — PROCEDURE VISIT (OUTPATIENT)
Dept: SLEEP MEDICINE | Facility: HOSPITAL | Age: 54
End: 2019-10-07
Attending: INTERNAL MEDICINE
Payer: COMMERCIAL

## 2019-10-07 DIAGNOSIS — G47.33 OSA (OBSTRUCTIVE SLEEP APNEA): ICD-10-CM

## 2019-10-07 DIAGNOSIS — G47.9 FATIGUE DUE TO SLEEP PATTERN DISTURBANCE: ICD-10-CM

## 2019-10-07 DIAGNOSIS — R06.83 SNORING: ICD-10-CM

## 2019-10-07 DIAGNOSIS — G47.19 EXCESSIVE DAYTIME SLEEPINESS: ICD-10-CM

## 2019-10-07 DIAGNOSIS — R53.83 FATIGUE DUE TO SLEEP PATTERN DISTURBANCE: ICD-10-CM

## 2019-10-07 PROCEDURE — 95806 SLEEP STUDY UNATT&RESP EFFT: CPT

## 2019-10-21 ENCOUNTER — HOSPITAL ENCOUNTER (INPATIENT)
Facility: HOSPITAL | Age: 54
LOS: 1 days | Discharge: HOME | DRG: 247 | End: 2019-10-22
Attending: EMERGENCY MEDICINE | Admitting: INTERNAL MEDICINE
Payer: COMMERCIAL

## 2019-10-21 ENCOUNTER — CLINICAL SUPPORT (OUTPATIENT)
Dept: CARDIOLOGY | Facility: HOSPITAL | Age: 54
DRG: 247 | End: 2019-10-21
Attending: INTERNAL MEDICINE
Payer: COMMERCIAL

## 2019-10-21 ENCOUNTER — HOSPITAL ENCOUNTER (EMERGENCY)
Facility: HOSPITAL | Age: 54
Discharge: ANOTHER HOSPITAL | End: 2019-10-21
Attending: EMERGENCY MEDICINE
Payer: COMMERCIAL

## 2019-10-21 VITALS
HEART RATE: 59 BPM | WEIGHT: 245 LBS | OXYGEN SATURATION: 97 % | RESPIRATION RATE: 20 BRPM | HEIGHT: 71 IN | DIASTOLIC BLOOD PRESSURE: 67 MMHG | BODY MASS INDEX: 34.3 KG/M2 | SYSTOLIC BLOOD PRESSURE: 127 MMHG

## 2019-10-21 DIAGNOSIS — Z82.49 FAMILY HISTORY OF PREMATURE CAD: ICD-10-CM

## 2019-10-21 DIAGNOSIS — R79.89 LOW TESTOSTERONE: ICD-10-CM

## 2019-10-21 DIAGNOSIS — I21.4 NSTEMI (NON-ST ELEVATED MYOCARDIAL INFARCTION): Primary | ICD-10-CM

## 2019-10-21 DIAGNOSIS — R07.9 CHEST PAIN: ICD-10-CM

## 2019-10-21 DIAGNOSIS — R79.89 ELEVATED TROPONIN: Primary | ICD-10-CM

## 2019-10-21 DIAGNOSIS — R07.89 OTHER CHEST PAIN: ICD-10-CM

## 2019-10-21 LAB
ALBUMIN SERPL BCP-MCNC: 3.9 G/DL (ref 3.5–5.2)
ALBUMIN SERPL BCP-MCNC: 4.2 G/DL (ref 3.5–5.2)
ALP SERPL-CCNC: 44 U/L (ref 55–135)
ALP SERPL-CCNC: 73 U/L (ref 55–135)
ALT SERPL W/O P-5'-P-CCNC: 42 U/L (ref 10–44)
ALT SERPL W/O P-5'-P-CCNC: 48 U/L (ref 10–44)
ANION GAP SERPL CALC-SCNC: 8 MMOL/L (ref 8–16)
ANION GAP SERPL CALC-SCNC: 9 MMOL/L (ref 8–16)
AORTIC ROOT ANNULUS: 3.66 CM
AORTIC VALVE CUSP SEPERATION: 2.2 CM
APTT BLDCRRT: 28 SEC (ref 21–32)
APTT PPP: 28.4 SEC (ref 23.6–33.3)
AST SERPL-CCNC: 51 U/L (ref 10–40)
AST SERPL-CCNC: 62 U/L (ref 10–40)
AV INDEX (PROSTH): 0.77
AV MEAN GRADIENT: 4 MMHG
AV PEAK GRADIENT: 7 MMHG
AV VALVE AREA: 2.44 CM2
AV VELOCITY RATIO: 81.98
BASOPHILS # BLD AUTO: 0.04 K/UL (ref 0–0.2)
BASOPHILS # BLD AUTO: 0.05 K/UL (ref 0–0.2)
BASOPHILS # BLD AUTO: 0.05 K/UL (ref 0–0.2)
BASOPHILS NFR BLD: 0.3 % (ref 0–1.9)
BASOPHILS NFR BLD: 0.5 % (ref 0–1.9)
BASOPHILS NFR BLD: 0.6 % (ref 0–1.9)
BILIRUB SERPL-MCNC: 1.2 MG/DL (ref 0.1–1)
BILIRUB SERPL-MCNC: 1.5 MG/DL (ref 0.1–1)
BNP SERPL-MCNC: 16 PG/ML (ref 0–99)
BNP SERPL-MCNC: <10 PG/ML (ref 0–99)
BSA FOR ECHO PROCEDURE: 2.12 M2
BUN SERPL-MCNC: 18 MG/DL (ref 6–20)
BUN SERPL-MCNC: 19 MG/DL (ref 6–20)
CALCIUM SERPL-MCNC: 8.1 MG/DL (ref 8.7–10.5)
CALCIUM SERPL-MCNC: 8.8 MG/DL (ref 8.7–10.5)
CHLORIDE SERPL-SCNC: 103 MMOL/L (ref 95–110)
CHLORIDE SERPL-SCNC: 103 MMOL/L (ref 95–110)
CO2 SERPL-SCNC: 26 MMOL/L (ref 23–29)
CO2 SERPL-SCNC: 27 MMOL/L (ref 23–29)
CREAT SERPL-MCNC: 1.3 MG/DL (ref 0.5–1.4)
CREAT SERPL-MCNC: 1.4 MG/DL (ref 0.5–1.4)
CV ECHO LV RWT: 0.69 CM
DIFFERENTIAL METHOD: ABNORMAL
DOP CALC AO PEAK VEL: 1.3 M/S
DOP CALC AO VTI: 28.47 CM
DOP CALC LVOT AREA: 3.2 CM2
DOP CALC LVOT DIAMETER: 2.01 CM
DOP CALC LVOT PEAK VEL: 106.58 M/S
DOP CALC LVOT STROKE VOLUME: 69.36 CM3
DOP CALCLVOT PEAK VEL VTI: 21.87 CM
E WAVE DECELERATION TIME: 145.04 MSEC
E/A RATIO: 1.29
E/E' RATIO: 6.38 M/S
ECHO LV POSTERIOR WALL: 1.44 CM (ref 0.6–1.1)
EOSINOPHIL # BLD AUTO: 0 K/UL (ref 0–0.5)
EOSINOPHIL # BLD AUTO: 0 K/UL (ref 0–0.5)
EOSINOPHIL # BLD AUTO: 0.2 K/UL (ref 0–0.5)
EOSINOPHIL NFR BLD: 0.2 % (ref 0–8)
EOSINOPHIL NFR BLD: 0.4 % (ref 0–8)
EOSINOPHIL NFR BLD: 1.9 % (ref 0–8)
ERYTHROCYTE [DISTWIDTH] IN BLOOD BY AUTOMATED COUNT: 17.4 % (ref 11.5–14.5)
ERYTHROCYTE [DISTWIDTH] IN BLOOD BY AUTOMATED COUNT: 17.6 % (ref 11.5–14.5)
ERYTHROCYTE [DISTWIDTH] IN BLOOD BY AUTOMATED COUNT: 18.4 % (ref 11.5–14.5)
EST. GFR  (AFRICAN AMERICAN): >60 ML/MIN/1.73 M^2
EST. GFR  (AFRICAN AMERICAN): >60 ML/MIN/1.73 M^2
EST. GFR  (NON AFRICAN AMERICAN): 56 ML/MIN/1.73 M^2
EST. GFR  (NON AFRICAN AMERICAN): >60 ML/MIN/1.73 M^2
FRACTIONAL SHORTENING: 40 % (ref 28–44)
GLUCOSE SERPL-MCNC: 113 MG/DL (ref 70–110)
GLUCOSE SERPL-MCNC: 127 MG/DL (ref 70–110)
HCT VFR BLD AUTO: 48.7 % (ref 40–54)
HCT VFR BLD AUTO: 50.3 % (ref 40–54)
HCT VFR BLD AUTO: 53.9 % (ref 40–54)
HGB BLD-MCNC: 15.5 G/DL (ref 14–18)
HGB BLD-MCNC: 15.9 G/DL (ref 14–18)
HGB BLD-MCNC: 16.9 G/DL (ref 14–18)
IMM GRANULOCYTES # BLD AUTO: 0.03 K/UL (ref 0–0.04)
IMM GRANULOCYTES # BLD AUTO: 0.04 K/UL (ref 0–0.04)
IMM GRANULOCYTES # BLD AUTO: 0.05 K/UL (ref 0–0.04)
IMM GRANULOCYTES NFR BLD AUTO: 0.4 % (ref 0–0.5)
IMM GRANULOCYTES NFR BLD AUTO: 0.4 % (ref 0–0.5)
INR PPP: 1.1 (ref 0.8–1.2)
INTERVENTRICULAR SEPTUM: 1.45 CM (ref 0.6–1.1)
IVRT: 70.45 MSEC
LEFT ATRIUM SIZE: 3.95 CM
LEFT INTERNAL DIMENSION IN SYSTOLE: 2.52 CM (ref 2.1–4)
LEFT VENTRICLE MASS INDEX: 102 G/M2
LEFT VENTRICULAR INTERNAL DIMENSION IN DIASTOLE: 4.2 CM (ref 3.5–6)
LEFT VENTRICULAR MASS: 235.48 G
LV LATERAL E/E' RATIO: 6.09 M/S
LV SEPTAL E/E' RATIO: 6.7 M/S
LYMPHOCYTES # BLD AUTO: 0.9 K/UL (ref 1–4.8)
LYMPHOCYTES # BLD AUTO: 1.1 K/UL (ref 1–4.8)
LYMPHOCYTES # BLD AUTO: 2 K/UL (ref 1–4.8)
LYMPHOCYTES NFR BLD: 11.2 % (ref 18–48)
LYMPHOCYTES NFR BLD: 22.8 % (ref 18–48)
LYMPHOCYTES NFR BLD: 7.2 % (ref 18–48)
MCH RBC QN AUTO: 21.4 PG (ref 27–31)
MCHC RBC AUTO-ENTMCNC: 31.4 G/DL (ref 32–36)
MCHC RBC AUTO-ENTMCNC: 31.6 G/DL (ref 32–36)
MCHC RBC AUTO-ENTMCNC: 31.8 G/DL (ref 32–36)
MCV RBC AUTO: 67 FL (ref 82–98)
MCV RBC AUTO: 68 FL (ref 82–98)
MCV RBC AUTO: 68 FL (ref 82–98)
MONOCYTES # BLD AUTO: 0.6 K/UL (ref 0.3–1)
MONOCYTES # BLD AUTO: 0.8 K/UL (ref 0.3–1)
MONOCYTES # BLD AUTO: 0.9 K/UL (ref 0.3–1)
MONOCYTES NFR BLD: 10.1 % (ref 4–15)
MONOCYTES NFR BLD: 5.6 % (ref 4–15)
MONOCYTES NFR BLD: 6.3 % (ref 4–15)
MV PEAK A VEL: 0.52 M/S
MV PEAK E VEL: 0.67 M/S
NEUTROPHILS # BLD AUTO: 11.1 K/UL (ref 1.8–7.7)
NEUTROPHILS # BLD AUTO: 5.7 K/UL (ref 1.8–7.7)
NEUTROPHILS # BLD AUTO: 8.4 K/UL (ref 1.8–7.7)
NEUTROPHILS NFR BLD: 64.3 % (ref 38–73)
NEUTROPHILS NFR BLD: 81.9 % (ref 38–73)
NEUTROPHILS NFR BLD: 85.6 % (ref 38–73)
NRBC BLD-RTO: 0 /100 WBC
PLATELET # BLD AUTO: 208 K/UL (ref 150–350)
PLATELET # BLD AUTO: 214 K/UL (ref 150–350)
PLATELET # BLD AUTO: 218 K/UL (ref 150–350)
PMV BLD AUTO: 10 FL (ref 9.2–12.9)
PMV BLD AUTO: 10.3 FL (ref 9.2–12.9)
PMV BLD AUTO: 9.6 FL (ref 9.2–12.9)
POC ACTIVATED CLOTTING TIME K: 125 SEC (ref 74–137)
POTASSIUM SERPL-SCNC: 3.9 MMOL/L (ref 3.5–5.1)
POTASSIUM SERPL-SCNC: 4.4 MMOL/L (ref 3.5–5.1)
PROT SERPL-MCNC: 6.4 G/DL (ref 6–8.4)
PROT SERPL-MCNC: 6.9 G/DL (ref 6–8.4)
PROTHROMBIN TIME: 10.8 SEC (ref 9–12.5)
PV PEAK VELOCITY: 89.22 CM/S
RA PRESSURE: 8 MMHG
RBC # BLD AUTO: 7.23 M/UL (ref 4.6–6.2)
RBC # BLD AUTO: 7.43 M/UL (ref 4.6–6.2)
RBC # BLD AUTO: 7.91 M/UL (ref 4.6–6.2)
RIGHT VENTRICULAR END-DIASTOLIC DIMENSION: 300 CM
SAMPLE: NORMAL
SODIUM SERPL-SCNC: 137 MMOL/L (ref 136–145)
SODIUM SERPL-SCNC: 139 MMOL/L (ref 136–145)
TDI LATERAL: 0.11 M/S
TDI SEPTAL: 0.1 M/S
TDI: 0.11 M/S
TROPONIN I SERPL DL<=0.01 NG/ML-MCNC: 0.59 NG/ML (ref 0–0.03)
TROPONIN I SERPL DL<=0.01 NG/ML-MCNC: 0.65 NG/ML (ref 0–0.03)
TROPONIN I SERPL DL<=0.01 NG/ML-MCNC: 1.38 NG/ML (ref 0.02–0.04)
TROPONIN I SERPL DL<=0.01 NG/ML-MCNC: 45.11 NG/ML (ref 0.02–0.04)
TROPONIN I SERPL DL<=0.01 NG/ML-MCNC: 47.4 NG/ML (ref 0.02–0.04)
WBC # BLD AUTO: 10.21 K/UL (ref 3.9–12.7)
WBC # BLD AUTO: 12.98 K/UL (ref 3.9–12.7)
WBC # BLD AUTO: 8.93 K/UL (ref 3.9–12.7)

## 2019-10-21 PROCEDURE — 63600175 PHARM REV CODE 636 W HCPCS: Performed by: EMERGENCY MEDICINE

## 2019-10-21 PROCEDURE — 85730 THROMBOPLASTIN TIME PARTIAL: CPT

## 2019-10-21 PROCEDURE — 25000003 PHARM REV CODE 250: Performed by: EMERGENCY MEDICINE

## 2019-10-21 PROCEDURE — 80053 COMPREHEN METABOLIC PANEL: CPT | Mod: 91

## 2019-10-21 PROCEDURE — 36415 COLL VENOUS BLD VENIPUNCTURE: CPT

## 2019-10-21 PROCEDURE — 93005 ELECTROCARDIOGRAM TRACING: CPT

## 2019-10-21 PROCEDURE — 96376 TX/PRO/DX INJ SAME DRUG ADON: CPT

## 2019-10-21 PROCEDURE — 63600175 PHARM REV CODE 636 W HCPCS: Performed by: INTERNAL MEDICINE

## 2019-10-21 PROCEDURE — 85025 COMPLETE CBC W/AUTO DIFF WBC: CPT

## 2019-10-21 PROCEDURE — 93306 TTE W/DOPPLER COMPLETE: CPT

## 2019-10-21 PROCEDURE — 84484 ASSAY OF TROPONIN QUANT: CPT | Mod: 91

## 2019-10-21 PROCEDURE — 85025 COMPLETE CBC W/AUTO DIFF WBC: CPT | Mod: 91

## 2019-10-21 PROCEDURE — 96375 TX/PRO/DX INJ NEW DRUG ADDON: CPT

## 2019-10-21 PROCEDURE — C1769 GUIDE WIRE: HCPCS | Performed by: INTERNAL MEDICINE

## 2019-10-21 PROCEDURE — C1874 STENT, COATED/COV W/DEL SYS: HCPCS | Performed by: INTERNAL MEDICINE

## 2019-10-21 PROCEDURE — 25500020 PHARM REV CODE 255: Performed by: EMERGENCY MEDICINE

## 2019-10-21 PROCEDURE — 93454 CORONARY ARTERY ANGIO S&I: CPT | Mod: XU

## 2019-10-21 PROCEDURE — 21000000 HC CCU ICU ROOM CHARGE

## 2019-10-21 PROCEDURE — 25000003 PHARM REV CODE 250: Performed by: INTERNAL MEDICINE

## 2019-10-21 PROCEDURE — 99291 CRITICAL CARE FIRST HOUR: CPT

## 2019-10-21 PROCEDURE — C1887 CATHETER, GUIDING: HCPCS | Performed by: INTERNAL MEDICINE

## 2019-10-21 PROCEDURE — 99291 CRITICAL CARE FIRST HOUR: CPT | Mod: 25

## 2019-10-21 PROCEDURE — C1725 CATH, TRANSLUMIN NON-LASER: HCPCS | Performed by: INTERNAL MEDICINE

## 2019-10-21 PROCEDURE — 85610 PROTHROMBIN TIME: CPT

## 2019-10-21 PROCEDURE — C9600 PERC DRUG-EL COR STENT SING: HCPCS | Mod: LC

## 2019-10-21 PROCEDURE — 96374 THER/PROPH/DIAG INJ IV PUSH: CPT | Mod: 59

## 2019-10-21 PROCEDURE — 80053 COMPREHEN METABOLIC PANEL: CPT

## 2019-10-21 PROCEDURE — 83880 ASSAY OF NATRIURETIC PEPTIDE: CPT

## 2019-10-21 PROCEDURE — 96365 THER/PROPH/DIAG IV INF INIT: CPT

## 2019-10-21 PROCEDURE — 85730 THROMBOPLASTIN TIME PARTIAL: CPT | Mod: 91

## 2019-10-21 PROCEDURE — 96366 THER/PROPH/DIAG IV INF ADDON: CPT

## 2019-10-21 PROCEDURE — 94761 N-INVAS EAR/PLS OXIMETRY MLT: CPT

## 2019-10-21 PROCEDURE — 83880 ASSAY OF NATRIURETIC PEPTIDE: CPT | Mod: 91

## 2019-10-21 PROCEDURE — C1894 INTRO/SHEATH, NON-LASER: HCPCS | Performed by: INTERNAL MEDICINE

## 2019-10-21 PROCEDURE — 96374 THER/PROPH/DIAG INJ IV PUSH: CPT | Performed by: INTERNAL MEDICINE

## 2019-10-21 DEVICE — IMPLANTABLE DEVICE: Type: IMPLANTABLE DEVICE | Site: HEART | Status: FUNCTIONAL

## 2019-10-21 RX ORDER — ASPIRIN 325 MG
325 TABLET ORAL
Status: DISCONTINUED | OUTPATIENT
Start: 2019-10-21 | End: 2019-10-21

## 2019-10-21 RX ORDER — SODIUM CHLORIDE 450 MG/100ML
INJECTION, SOLUTION INTRAVENOUS CONTINUOUS
Status: DISCONTINUED | OUTPATIENT
Start: 2019-10-21 | End: 2019-10-22 | Stop reason: HOSPADM

## 2019-10-21 RX ORDER — LISINOPRIL 2.5 MG/1
2.5 TABLET ORAL DAILY
Status: DISCONTINUED | OUTPATIENT
Start: 2019-10-21 | End: 2019-10-22 | Stop reason: HOSPADM

## 2019-10-21 RX ORDER — HEPARIN SODIUM,PORCINE/D5W 25000/250
12 INTRAVENOUS SOLUTION INTRAVENOUS CONTINUOUS
Status: DISCONTINUED | OUTPATIENT
Start: 2019-10-21 | End: 2019-10-21 | Stop reason: HOSPADM

## 2019-10-21 RX ORDER — MV/FA/DHA/EPA/FISH OIL/SAW/GNK 400MCG-200
500 COMBINATION PACKAGE (EA) ORAL 2 TIMES DAILY
Status: ON HOLD | COMMUNITY
End: 2019-10-22 | Stop reason: HOSPADM

## 2019-10-21 RX ORDER — ONDANSETRON 2 MG/ML
4 INJECTION INTRAMUSCULAR; INTRAVENOUS EVERY 12 HOURS PRN
Status: DISCONTINUED | OUTPATIENT
Start: 2019-10-21 | End: 2019-10-22 | Stop reason: HOSPADM

## 2019-10-21 RX ORDER — ONDANSETRON 2 MG/ML
4 INJECTION INTRAMUSCULAR; INTRAVENOUS EVERY 8 HOURS PRN
Status: DISCONTINUED | OUTPATIENT
Start: 2019-10-21 | End: 2019-10-22 | Stop reason: HOSPADM

## 2019-10-21 RX ORDER — SODIUM CHLORIDE 0.9 % (FLUSH) 0.9 %
10 SYRINGE (ML) INJECTION
Status: DISCONTINUED | OUTPATIENT
Start: 2019-10-21 | End: 2019-10-22 | Stop reason: HOSPADM

## 2019-10-21 RX ORDER — METOPROLOL SUCCINATE 25 MG/1
25 TABLET, EXTENDED RELEASE ORAL DAILY
Status: DISCONTINUED | OUTPATIENT
Start: 2019-10-21 | End: 2019-10-22 | Stop reason: HOSPADM

## 2019-10-21 RX ORDER — MIDAZOLAM HYDROCHLORIDE 1 MG/ML
INJECTION INTRAMUSCULAR; INTRAVENOUS
Status: DISCONTINUED | OUTPATIENT
Start: 2019-10-21 | End: 2019-10-21

## 2019-10-21 RX ORDER — ACETAMINOPHEN 325 MG/1
650 TABLET ORAL EVERY 4 HOURS PRN
Status: DISCONTINUED | OUTPATIENT
Start: 2019-10-21 | End: 2019-10-22 | Stop reason: HOSPADM

## 2019-10-21 RX ORDER — ATORVASTATIN CALCIUM 40 MG/1
80 TABLET, FILM COATED ORAL NIGHTLY
Status: DISCONTINUED | OUTPATIENT
Start: 2019-10-21 | End: 2019-10-22

## 2019-10-21 RX ORDER — PANTOPRAZOLE SODIUM 40 MG/1
40 TABLET, DELAYED RELEASE ORAL DAILY
Status: DISCONTINUED | OUTPATIENT
Start: 2019-10-21 | End: 2019-10-22 | Stop reason: HOSPADM

## 2019-10-21 RX ORDER — MORPHINE SULFATE 2 MG/ML
6 INJECTION, SOLUTION INTRAMUSCULAR; INTRAVENOUS
Status: COMPLETED | OUTPATIENT
Start: 2019-10-21 | End: 2019-10-21

## 2019-10-21 RX ORDER — SODIUM CHLORIDE 9 MG/ML
INJECTION, SOLUTION INTRAVENOUS ONCE
Status: DISCONTINUED | OUTPATIENT
Start: 2019-10-21 | End: 2019-10-21

## 2019-10-21 RX ORDER — LIDOCAINE HYDROCHLORIDE 10 MG/ML
INJECTION, SOLUTION EPIDURAL; INFILTRATION; INTRACAUDAL; PERINEURAL
Status: DISCONTINUED | OUTPATIENT
Start: 2019-10-21 | End: 2019-10-21

## 2019-10-21 RX ORDER — ASPIRIN 325 MG
325 TABLET ORAL
Status: COMPLETED | OUTPATIENT
Start: 2019-10-21 | End: 2019-10-21

## 2019-10-21 RX ORDER — FENTANYL CITRATE 50 UG/ML
INJECTION, SOLUTION INTRAMUSCULAR; INTRAVENOUS
Status: DISCONTINUED | OUTPATIENT
Start: 2019-10-21 | End: 2019-10-21

## 2019-10-21 RX ORDER — HEPARIN SODIUM 10000 [USP'U]/ML
INJECTION, SOLUTION INTRAVENOUS; SUBCUTANEOUS
Status: DISCONTINUED | OUTPATIENT
Start: 2019-10-21 | End: 2019-10-21

## 2019-10-21 RX ORDER — METOPROLOL TARTRATE 1 MG/ML
5 INJECTION, SOLUTION INTRAVENOUS
Status: COMPLETED | OUTPATIENT
Start: 2019-10-21 | End: 2019-10-21

## 2019-10-21 RX ORDER — ASPIRIN 81 MG/1
81 TABLET ORAL DAILY
Status: DISCONTINUED | OUTPATIENT
Start: 2019-10-22 | End: 2019-10-22 | Stop reason: HOSPADM

## 2019-10-21 RX ORDER — DIPHENHYDRAMINE HCL 25 MG
50 CAPSULE ORAL
Status: DISCONTINUED | OUTPATIENT
Start: 2019-10-21 | End: 2019-10-22 | Stop reason: HOSPADM

## 2019-10-21 RX ORDER — HEPARIN SODIUM 10000 [USP'U]/100ML
12 INJECTION, SOLUTION INTRAVENOUS
Status: COMPLETED | OUTPATIENT
Start: 2019-10-21 | End: 2019-10-21

## 2019-10-21 RX ADMIN — PANTOPRAZOLE SODIUM 40 MG: 40 TABLET, DELAYED RELEASE ORAL at 08:10

## 2019-10-21 RX ADMIN — METOPROLOL TARTRATE 5 MG: 1 INJECTION, SOLUTION INTRAVENOUS at 11:10

## 2019-10-21 RX ADMIN — NITROGLYCERIN 1 INCH: 20 OINTMENT TOPICAL at 04:10

## 2019-10-21 RX ADMIN — ASPIRIN 325 MG ORAL TABLET 325 MG: 325 PILL ORAL at 04:10

## 2019-10-21 RX ADMIN — ONDANSETRON 4 MG: 2 INJECTION INTRAMUSCULAR; INTRAVENOUS at 02:10

## 2019-10-21 RX ADMIN — IOHEXOL 100 ML: 350 INJECTION, SOLUTION INTRAVENOUS at 05:10

## 2019-10-21 RX ADMIN — LISINOPRIL 2.5 MG: 2.5 TABLET ORAL at 06:10

## 2019-10-21 RX ADMIN — SODIUM CHLORIDE: 0.45 INJECTION, SOLUTION INTRAVENOUS at 02:10

## 2019-10-21 RX ADMIN — HEPARIN SODIUM AND DEXTROSE 12 UNITS/KG/HR: 10000; 5 INJECTION INTRAVENOUS at 07:10

## 2019-10-21 RX ADMIN — ATORVASTATIN CALCIUM 80 MG: 40 TABLET, FILM COATED ORAL at 08:10

## 2019-10-21 RX ADMIN — HEPARIN SODIUM 12 UNITS/KG/HR: 10000 INJECTION, SOLUTION INTRAVENOUS at 10:10

## 2019-10-21 RX ADMIN — METOPROLOL SUCCINATE 25 MG: 25 TABLET, FILM COATED, EXTENDED RELEASE ORAL at 06:10

## 2019-10-21 RX ADMIN — MORPHINE SULFATE 6 MG: 2 INJECTION, SOLUTION INTRAMUSCULAR; INTRAVENOUS at 04:10

## 2019-10-21 RX ADMIN — SODIUM CHLORIDE 1000 ML: 0.9 INJECTION, SOLUTION INTRAVENOUS at 05:10

## 2019-10-21 NOTE — ED PROVIDER NOTES
Encounter Date: 10/21/2019       History     Chief Complaint   Patient presents with    Chest Pain     started yesterday off and on and stopped, came back and worse     This patient is a 54-year-old male, testosterone replacement, with a past history of hyperlipidemia corrected with lifestyle modifications presenting with complaints of left sharp chest wall pain that has been intermittent over the past 24 hr.  He does report some mild pain underneath the left jaw that has been concomitant.  He denies previous history of heart attacks, diagnosis of hypertension, diabetes, past history of smoking.  He denies past history of DVT or PE or recent unilateral or bilateral lower extremity swelling.  Denies accompanying shortness of breath or cough, fever.  He denies taking anything for his pain prior to arrival.  Records indicate his last cardiac rule out was in 2015.        Review of patient's allergies indicates:   Allergen Reactions    Lactose intolerance [lactase]      Past Medical History:   Diagnosis Date    Anemia     AR (allergic rhinitis)      Past Surgical History:   Procedure Laterality Date    REFRACTIVE SURGERY  1998    SINUS SURGERY  1995     Family History   Problem Relation Age of Onset    Dementia Mother     Heart disease Father         first MI at 63    Heart failure Father      Social History     Tobacco Use    Smoking status: Never Smoker    Smokeless tobacco: Never Used   Substance Use Topics    Alcohol use: No     Comment: QUIT 1985    Drug use: No     Review of Systems   Constitutional: Negative for fever.   HENT: Negative for congestion.    Respiratory: Negative for shortness of breath.    Cardiovascular: Positive for chest pain. Negative for leg swelling.   Gastrointestinal: Negative for abdominal pain.   Musculoskeletal: Negative for back pain.   Skin: Negative for pallor.   Allergic/Immunologic: Negative for immunocompromised state.   Neurological: Negative for weakness.    Hematological: Does not bruise/bleed easily.   Psychiatric/Behavioral: Negative for confusion.       Physical Exam     Initial Vitals [10/21/19 0401]   BP Pulse Resp Temp SpO2   (!) 191/104 90 20 -- 99 %      MAP       --         Physical Exam    Nursing note and vitals reviewed.  Constitutional: He appears well-nourished. No distress.   HENT:   Head: Normocephalic and atraumatic.   Eyes: Conjunctivae and EOM are normal.   Neck: Normal range of motion. Neck supple.   Cardiovascular: Normal rate and regular rhythm.   Pulmonary/Chest: No respiratory distress. He has no wheezes. He has no rales. He exhibits no tenderness.   Abdominal: Bowel sounds are normal. There is no tenderness.   Musculoskeletal: Normal range of motion. He exhibits no edema.   Neurological: He is alert and oriented to person, place, and time. He has normal strength. No sensory deficit. GCS score is 15. GCS eye subscore is 4. GCS verbal subscore is 5. GCS motor subscore is 6.   Skin: Skin is warm and dry.   Psychiatric: He has a normal mood and affect. Thought content normal.         ED Course   Procedures  Labs Reviewed   CBC W/ AUTO DIFFERENTIAL   COMPREHENSIVE METABOLIC PANEL   TROPONIN I   B-TYPE NATRIURETIC PEPTIDE     EKG Readings: (Independently Interpreted)   Normal sinus rhythm, 87 beats per minute, normal axis, normal intervals, ST depression, consider subendocardial injury, no contiguous precordial leads with concave depressions concerning for posterior STEMI, no reciprocal changes, no STEMI       Imaging Results          X-Ray Chest AP Portable (In process)                  Medical Decision Making:   ED Management:  Patient was interviewed and assessed emergently.  Initial vital signs are significant for marked hypertension with blood pressures exceeding 200/100.  EKG shows some ST segment depression V2, V3 without reciprocal changes.  Patient was provided aspirin, morphine, nitro paste, fluids w/gradual improvement in pain and  reduction in blood pressure.  Workup indicates troponin elevation to 0.6 with intact renal function.  CTA demonstrates no evidence of great vessel disease including dissection or aneurysm.  Case was discussed with the on-call cardiologist, Dr. Joy and Dr. Mejia, who recommend heparinization for ACS and transfer to [facility] for further cardiac monitoring and diagnosis.  Patient provided informed consent regarding heparin administration and denies past history of intracranial bleeding, GI bleeding, recent surgical procedures.  Case additionally discussed with the house supervisor and hospitalist for [facility].  Patient and wife updated on the plan of care and they are in agreement and he is transferred to [facility] Emergency Department in guarded condition.              Attending Attestation:         Attending Critical Care:   Critical Care Times:   Direct Patient Care (initial evaluation, reassessments, and time considering the case)................................................................25 minutes.   Additional History from reviewing old medical records or taking additional history from the family, EMS, PCP, etc.......................5 minutes.   Ordering, Reviewing, and Interpreting Diagnostic Studies...............................................................................................................5 minutes.   Documentation..................................................................................................................................................................................5 minutes.   Consultation with other Physicians. .................................................................................................................................................10 minutes.   Consultation with the patient's family directly relating to the patient's condition, care, and DNR status (when patient unable)......10 minutes.    Other..................................................................................................................................................................................................0 minutes.   ==============================================================  · Total Critical Care Time - exclusive of procedural time: 60 minutes.  ==============================================================  Critical care was necessary to treat or prevent imminent or life-threatening deterioration of the following conditions: myocardial infarction.   The following critical care procedures were done by me (see procedure notes): pulse oximetry.   Critical care was time spent personally by me on the following activities: obtaining history from patient or relative, examination of patient, review of old charts, ordering lab, x-rays, and/or EKG, development of treatment plan with patient or relative, ordering and performing treatments and interventions, evaluation of patient's response to treatment, discussions with primary provider, discussion with consultants, interpretation of cardiac measurements and re-evaluation of patient's conition.   Critical Care Condition: potentially life-threatening                  Clinical Impression:       ICD-10-CM ICD-9-CM   1. Elevated troponin R79.89 790.6   2. Chest pain R07.9 786.50         Disposition:   Disposition: Transferred  Condition: Serious                        Luis De La Torre MD  10/21/19 0635

## 2019-10-21 NOTE — ED NOTES
Assumed care from Amalia:    Patient awake, alert and oriented x 3, skin warm and dry, in NAD with family at bedside.  Hep Lock in place, site OK, side rails up, call bell within reach.  Awaiting transport.

## 2019-10-21 NOTE — CONSULTS
[facility]  Cardiology  Consult    Patient Name: Enrico Talbot  MRN: [id number]  Admission Date: 10/21/2019  Code Status: No Order   Attending Provider: Eliezer Murillo MD   Primary Care Physician: Primary Doctor No  Principal Problem:<principal problem not specified>    Patient information was obtained from patient, spouse/SO, relative(s) and ER records.     Subjective:     Chief Complaint:  New onset chest pain    HPI:  54-year-old gentleman with a history of fall dyslipidemia n with strong family history has not seen any primary care physician more recently 100 developed chest discomfort on Saturday morning.  Pain was persistent on and off during medley yesterday and upon waking up this morning he had left precordial pain and decided to seek evaluation in the emergency room because of more persistent.  Is EKG has some nonspecific changes he was transferred from [facility] to [facility] ER for further evaluation.  Patient had markedly elevated blood pressure in upon arrival to the /107 mm of mercury.  Patient had become pain free he has been treated with nitrates metoprolol, , intravenous morphine and intravenous heparin.  He describes the pain as a burning sensation in the left pectoral area he also had a lot of dyspeptic symptoms yesterday with belching and burping although no melanotic stools, noted.   No cough and congestion no fevers or chills.  Reportedly patient has been taking diet supplements and fish oil capsules 1 management of his dyslipidemia    Past Medical History:   Diagnosis Date    Anemia     AR (allergic rhinitis)        Past Surgical History:   Procedure Laterality Date    REFRACTIVE SURGERY  1998    SINUS SURGERY  1995       Review of patient's allergies indicates:   Allergen Reactions    Lactose intolerance [lactase]        Current Facility-Administered Medications on File Prior to Encounter   Medication    [COMPLETED] aspirin tablet 325 mg     [COMPLETED] heparin 25,000 units in dextrose 5% (100 units/ml) IV bolus from bag INITIAL BOLUS (max bolus 4000 units)    [COMPLETED] iohexol (OMNIPAQUE 350) injection 100 mL    [COMPLETED] morphine injection 6 mg    [COMPLETED] morphine injection 6 mg    [COMPLETED] nitroGLYCERIN 2% TD oint ointment 1 inch    [COMPLETED] sodium chloride 0.9% bolus 1,000 mL    [DISCONTINUED] heparin 25,000 units in dextrose 5% (100 units/ml) IV bolus from bag - ADDITIONAL PRN BOLUS - 30 units/kg (max bolus 4000 units)    [DISCONTINUED] heparin 25,000 units in dextrose 5% (100 units/ml) IV bolus from bag - ADDITIONAL PRN BOLUS - 60 units/kg (max bolus 4000 units)    [DISCONTINUED] heparin 25,000 units in dextrose 5% 250 mL (100 units/mL) infusion LOW INTENSITY nomogram - [facility]     Current Outpatient Medications on File Prior to Encounter   Medication Sig    arginine, L-arginine, 500 mg PwPk Take 500 mg by mouth once daily. PT MIXES INTO A DRINK DAILY    ascorbic acid/multivit-min (EMERGEN-C ORAL) Take 1 packet by mouth once daily.    CITRULLINE 1000 ORAL Take 1,000 mg by mouth once daily.    coenzyme Q10 (CO Q-10) 200 mg capsule Take 200 mg by mouth once daily.      DIET SUPP#21/CALCIUM PHOSPHATE (PRONUTRIENTS FRUIT-VEGGIE ORAL) Take 1 capsule by mouth once daily.      fish oil-omega-3 fatty acids 300-1,000 mg capsule Take 2 capsules by mouth 2 (two) times daily.     krill oil 500 mg Cap Take 500 mg by mouth 2 (two) times daily.    MV-MN/FA/LYCOPENE/LUT/HB#178 (LILLI MULTIVITAMIN FOR MEN ORAL) Take 2 tablets by mouth once daily.      testosterone cypionate (DEPOTESTOTERONE CYPIONATE) 200 mg/mL injection Inject 0.35 mL IM twice weekly. (Patient taking differently: Inject 200 mg into the muscle every 7 days. )    [DISCONTINUED] ascorbic acid, vitamin C, (VITAMIN C) Powd Take 5,000 mg by mouth once daily.     [DISCONTINUED] IRON, FERROUS SULFATE, ORAL Take by mouth once daily.    [DISCONTINUED] lecithin 1,200 mg Chew  Take 1 tablet by mouth once daily.       Family History     Problem Relation (Age of Onset)    Dementia Mother    Heart disease Father    Heart failure Father        Tobacco Use    Smoking status: Never Smoker    Smokeless tobacco: Never Used   Substance and Sexual Activity    Alcohol use: No     Comment: QUIT 1985    Drug use: No    Sexual activity: Yes     Partners: Female       REVIEW OF SYSTEMS:    Constitutional: Negative for chills, fatigue and fever.   Eyes: No double vision, No blurred vision  Neuro: No headaches, No dizziness  Respiratory: Negative for cough, shortness of breath and wheezing.    Cardiovascular: Negative for chest pain. Negative for palpitations and leg swelling.   Gastrointestinal: Negative for abdominal pain, No melena, diarrhea, nausea and vomiting.  Positive for heartburn belching and burping.  Genitourinary: Negative for dysuria and frequency, Negative for hematuria he patient has been using testosterone injections.  Skin: Negative for bruising, Negative for edema or discoloration noted.  For the past 3 months patient has been using minoxidil cream for his hair  Endocrine: Negative for polyphagia, Negative for heat intolerance, Negative for cold intolerance  Psychiatric: Negative for depression, Negative for anxiety, Negative for memory loss  Musculoskeletal: Negative for neck pain, Negative for muscle weakness, Negative for back pain     Objective:     Vital Signs (Most Recent):  Temp: 99 °F (37.2 °C) (10/21/19 0830)  Pulse: 96 (10/21/19 1101)  Resp: (!) 30 (10/21/19 1101)  BP: (!) 176/82 (10/21/19 1101)  SpO2: 95 % (10/21/19 1101) Vital Signs (24h Range):  Temp:  [99 °F (37.2 °C)] 99 °F (37.2 °C)  Pulse:  [59-96] 96  Resp:  [14-30] 30  SpO2:  [95 %-99 %] 95 %  BP: (127-211)/() 176/82     Weight: 89.6 kg (197 lb 8.5 oz)  Body mass index is 27.55 kg/m².    SpO2: 95 %  O2 Device (Oxygen Therapy): room air    No intake or output data in the 24 hours ending 10/21/19  1106    Lines/Drains/Airways     Peripheral Intravenous Line                 Peripheral IV - Single Lumen 10/21/19 0410 20 G Right Forearm less than 1 day         Peripheral IV - Single Lumen 10/21/19 0540 18 G Left Forearm less than 1 day                PHYSICAL EXAM:    GENERAL: well built, well nourished, well-developed in no apparent distress alert and oriented.   HEENT: Normocephalic. Pupils normal and conjunctivae normal.  Mucous membranes normal, no cyanosis or icterus, trachea central,no pallor or icterus is noted..   NECK: No JVD. No bruit..   THYROID: Thyroid not enlarged. No nodules present..   CARDIAC: Regular rate and rhythm. S1 is normal.S2 is normal.No gallops, clicks or murmurs noted at this time.  CHEST ANATOMY: normal.   LUNGS: Clear to auscultation. No wheezing or rhonchi..    ABDOMEN: Soft no masses or organomegaly.  No abdomen pulsations or bruits.  Normal bowel sounds. No pulsations and no masses felt, No guarding or rebound.   URINARY: No arambula catheter   EXTREMITIES: No cyanosis, clubbing or edema noted at this time., no calf tenderness bilaterally.   PERIPHERAL VASCULAR SYSTEM: Good palpable distal pulses.   CENTRAL NERVOUS SYSTEM: No focal motor or sensory deficits noted.   SKIN: Skin without lesions, moist, well perfused.   MUSCLE STRENGTH & TONE: No noteable weakness, atrophy or abnormal movement.       Significant Labs:   Recent Lab Results       10/21/19  1000   10/21/19  0731   10/21/19  0410        Albumin 3.9   4.2     Alkaline Phosphatase 44   73     ALT 42   48     Anion Gap 8   9     aPTT     28.0  Comment:  aPTT therapeutic range = 39-69 seconds     AST 62   51     Baso #     0.05     Basophil%     0.6     BILIRUBIN TOTAL 1.5  Comment:  For infants and newborns, interpretation of results should be based  on gestational age, weight and in agreement with clinical  observations.  Premature Infant recommended reference ranges:  Up to 24 hours.............<8.0 mg/dL  Up to 48  hours............<12.0 mg/dL  3-5 days..................<15.0 mg/dL  6-29 days.................<15.0 mg/dL     1.2  Comment:  For infants and newborns, interpretation of results should be based  on gestational age, weight and in agreement with clinical  observations.  Premature Infant recommended reference ranges:  Up to 24 hours.............<8.0 mg/dL  Up to 48 hours............<12.0 mg/dL  3-5 days..................<15.0 mg/dL  6-29 days.................<15.0 mg/dL       BNP 16  Comment:  Values of less than 100 pg/ml are consistent with non-CHF populations.   <10  Comment:  Values of less than 100 pg/ml are consistent with non-CHF populations.     BUN, Bld 18   19     Calcium 8.1   8.8     Chloride 103   103     CO2 26   27     Creatinine 1.3   1.4     Differential Method     Automated     eGFR if  >60.0   >60     eGFR if non  >60.0  Comment:  Calculation used to obtain the estimated glomerular filtration  rate (eGFR) is the CKD-EPI equation.      56  Comment:  Calculation used to obtain the estimated glomerular filtration  rate (eGFR) is the CKD-EPI equation.        Eos #     0.2     Eosinophil%     1.9     Glucose 127   113     Gran # (ANC)     5.7     Gran%     64.3     Hematocrit 50.3   53.9     Hemoglobin 15.9   16.9     Immature Grans (Abs)     0.03  Comment:  Mild elevation in immature granulocytes is non specific and   can be seen in a variety of conditions including stress response,   acute inflammation, trauma and pregnancy. Correlation with other   laboratory and clinical findings is essential.       Coumadin Monitoring INR     1.1  Comment:  Coumadin Therapy:  2.0 - 3.0 for INR for all indicators except mechanical heart valves  and antiphospholipid syndromes which should use 2.5 - 3.5.       Lymph #     2.0     Lymph%     22.8     MCH 21.4   21.4     MCHC 31.6   31.4     MCV 68   68     Mono #     0.9     Mono%     10.1     MPV 10.3   10.0     nRBC     0     Platelets  218   214     Potassium 4.4   3.9     PROTEIN TOTAL 6.4   6.9     Protime     10.8     RBC 7.43   7.91     RDW 17.4   18.4     Sodium 137   139     Troponin I 1.383  Comment:  Troponin critical result(s) called and verbal readback obtained   from Sean Stewart RN ER. , 10/21/2019 10:42   0.588  Comment:  The reference interval for Troponin I represents the 99th percentile   cutoff   for our facility and is consistent with 3rd generation assay   performance.   0.646  Comment:  The reference interval for Troponin I represents the 99th percentile   cutoff   for our facility and is consistent with 3rd generation assay   performance.       WBC 10.21   8.93           Significant Imaging:   Imaging Results          X-Ray Chest AP Portable (Final result)  Result time 10/21/19 09:34:00    Final result by Abram Sanchez MD (10/21/19 09:34:00)                 Impression:      Stable chest radiograph.      Electronically signed by: Abram Sanchez MD  Date:    10/21/2019  Time:    09:34             Narrative:    EXAMINATION:  XR CHEST AP PORTABLE    CLINICAL HISTORY:  Chest Pain;    COMPARISON:  10/21/2019 chest radiography    FINDINGS:  Cardiac silhouette size is stable from prior.  Azygos fissure noted.  No confluent airspace disease.  No large pleural effusion or pneumothorax.  No acute osseous abnormality.  Patient underwent CT angiography of the chest earlier today, please see that report for further details.                              CT-scan of the chest, reportedly negative for I agree dissection.        I HAVE REVIEWED :    The vital signs, nurses notes, and all the pertinent radiology and labs.     Assessment and Plan:     1.  Non ST-elevation myocardial function:  Patient has mild elevation of his troponins.  With nonspecific changes in his EKG because of risk factors will proceed with angiographic assessment for more definite diagnosis with persistence of ongoing symptoms on medical therapy.  I have explained  the risks and benefits of cardiac catheterization and possible PCI to the patient in detail and informed consent for the same has been obtained.  The risks include the risk of heart attack stroke and death and bleeding complications and renal complications as well.  In the meantime continue on intravenous heparin, nitrate and beta-blocker therapy.  Further recommendations based on findings of the angiogram  2.  Dyslipidemia patient will benefit from statin therapy for aggressive management of his lipids given that he has strong family history.    3.  Low T levels currently on testosterone supplements.  Further usage may be need to be discussed with his urologist and primary care physician.    4.  Reactive hypertension needs aggressive control of his blood pressure to keep his systolic blood pressure 130 or less at all times and diastolic 90 or less.    5.  Dyspepsia  Consider maintain on PPI agents for the time being    VTE Risk Mitigation (From admission, onward)    None          Scooby Mejia MD  Cardiology   [facility]      I have personally interviewed and examined the patient, reviewed the findings with the patient and his family at bedside all the risks and benefits reviewed they seem to understand the risks and further plans.

## 2019-10-21 NOTE — ED NOTES
Pt transferred from [facility] for cardiology. Pt presented with chest pain for 24 hours and was diagnosed with elevated troponin. Pt currently states he is still having chest pain but improved from when he arrived at [facility].

## 2019-10-21 NOTE — ED TRIAGE NOTES
Here with chest pain since yesterday and off and on; went away and came back worse; sharp pain, +N -SOB

## 2019-10-21 NOTE — ED NOTES
Enrico Talbot now accepted in transfer to [facility] ED by Dr. Hanna; call report to:  [phone].  Transfer Center arranging transport within the hour.

## 2019-10-21 NOTE — ED NOTES
Pt presents with complaints of chest pain in left side of chest just above breast. Pain started yesterday and has been off and on. Denies associated shortness of breath or nausea at this time. At onset of pain yesterday pt notes that his Bp was elevated as well as a fast heart rate. Pain level is 7/10 per pt. Placed on vitals and cardiac monitor. Pt describes pain as sharp and continues to come and go. Lungs clear to auscultation. No noted edema to extremities. Alert and oriented with neuro intact.

## 2019-10-21 NOTE — Clinical Note
Catheter is inserted into the ostium   circumflex. Angiography performed of the left coronary arteries in multiple views. Angiography performed via power injection with 6 mL contrast at 3 mL/s.

## 2019-10-22 VITALS
WEIGHT: 250.25 LBS | RESPIRATION RATE: 19 BRPM | DIASTOLIC BLOOD PRESSURE: 64 MMHG | OXYGEN SATURATION: 97 % | HEIGHT: 71 IN | HEART RATE: 75 BPM | TEMPERATURE: 99 F | BODY MASS INDEX: 35.03 KG/M2 | SYSTOLIC BLOOD PRESSURE: 137 MMHG

## 2019-10-22 LAB
ANION GAP SERPL CALC-SCNC: 6 MMOL/L (ref 8–16)
BUN SERPL-MCNC: 19 MG/DL (ref 6–20)
CALCIUM SERPL-MCNC: 7.8 MG/DL (ref 8.7–10.5)
CHLORIDE SERPL-SCNC: 104 MMOL/L (ref 95–110)
CHOLEST SERPL-MCNC: 134 MG/DL (ref 120–199)
CHOLEST/HDLC SERPL: 5.4 {RATIO} (ref 2–5)
CO2 SERPL-SCNC: 26 MMOL/L (ref 23–29)
CREAT SERPL-MCNC: 1.3 MG/DL (ref 0.5–1.4)
EST. GFR  (AFRICAN AMERICAN): >60 ML/MIN/1.73 M^2
EST. GFR  (NON AFRICAN AMERICAN): >60 ML/MIN/1.73 M^2
ESTIMATED AVG GLUCOSE: 105 MG/DL (ref 68–131)
GLUCOSE SERPL-MCNC: 101 MG/DL (ref 70–110)
HBA1C MFR BLD HPLC: 5.3 % (ref 4.5–6.2)
HDLC SERPL-MCNC: 25 MG/DL (ref 40–75)
HDLC SERPL: 18.7 % (ref 20–50)
LDLC SERPL CALC-MCNC: 85.8 MG/DL (ref 63–159)
MAGNESIUM SERPL-MCNC: 1.6 MG/DL (ref 1.6–2.6)
NONHDLC SERPL-MCNC: 109 MG/DL
POTASSIUM SERPL-SCNC: 4.1 MMOL/L (ref 3.5–5.1)
SODIUM SERPL-SCNC: 136 MMOL/L (ref 136–145)
TRIGL SERPL-MCNC: 116 MG/DL (ref 30–150)
TROPONIN I SERPL DL<=0.01 NG/ML-MCNC: 31.87 NG/ML (ref 0.02–0.04)
TSH SERPL DL<=0.005 MIU/L-ACNC: 1.34 UIU/ML (ref 0.34–5.6)

## 2019-10-22 PROCEDURE — 80061 LIPID PANEL: CPT

## 2019-10-22 PROCEDURE — 83735 ASSAY OF MAGNESIUM: CPT

## 2019-10-22 PROCEDURE — 36415 COLL VENOUS BLD VENIPUNCTURE: CPT

## 2019-10-22 PROCEDURE — 84443 ASSAY THYROID STIM HORMONE: CPT

## 2019-10-22 PROCEDURE — 80048 BASIC METABOLIC PNL TOTAL CA: CPT

## 2019-10-22 PROCEDURE — 93005 ELECTROCARDIOGRAM TRACING: CPT

## 2019-10-22 PROCEDURE — 83036 HEMOGLOBIN GLYCOSYLATED A1C: CPT

## 2019-10-22 PROCEDURE — 25000003 PHARM REV CODE 250: Performed by: INTERNAL MEDICINE

## 2019-10-22 PROCEDURE — 94761 N-INVAS EAR/PLS OXIMETRY MLT: CPT

## 2019-10-22 PROCEDURE — 84484 ASSAY OF TROPONIN QUANT: CPT

## 2019-10-22 RX ORDER — ROSUVASTATIN CALCIUM 10 MG/1
10 TABLET, COATED ORAL NIGHTLY
Qty: 90 TABLET | Refills: 3 | Status: SHIPPED | OUTPATIENT
Start: 2019-10-22 | End: 2020-11-10 | Stop reason: SDUPTHER

## 2019-10-22 RX ORDER — LISINOPRIL 2.5 MG/1
2.5 TABLET ORAL DAILY
Qty: 90 TABLET | Refills: 3 | Status: SHIPPED | OUTPATIENT
Start: 2019-10-23 | End: 2019-10-22

## 2019-10-22 RX ORDER — ASPIRIN 81 MG/1
81 TABLET ORAL DAILY
Refills: 0 | COMMUNITY
Start: 2019-10-23 | End: 2023-09-19 | Stop reason: SDUPTHER

## 2019-10-22 RX ORDER — ROSUVASTATIN CALCIUM 10 MG/1
10 TABLET, COATED ORAL NIGHTLY
Status: DISCONTINUED | OUTPATIENT
Start: 2019-10-22 | End: 2019-10-22 | Stop reason: HOSPADM

## 2019-10-22 RX ORDER — METOPROLOL SUCCINATE 25 MG/1
25 TABLET, EXTENDED RELEASE ORAL DAILY
Qty: 30 TABLET | Refills: 11 | Status: SHIPPED | OUTPATIENT
Start: 2019-10-23 | End: 2021-02-08 | Stop reason: SDUPTHER

## 2019-10-22 RX ORDER — LISINOPRIL 2.5 MG/1
2.5 TABLET ORAL DAILY
Qty: 90 TABLET | Refills: 3 | Status: SHIPPED | OUTPATIENT
Start: 2019-10-23 | End: 2021-02-08 | Stop reason: SDUPTHER

## 2019-10-22 RX ORDER — METOPROLOL SUCCINATE 25 MG/1
25 TABLET, EXTENDED RELEASE ORAL DAILY
Qty: 30 TABLET | Refills: 11 | Status: SHIPPED | OUTPATIENT
Start: 2019-10-23 | End: 2019-10-22

## 2019-10-22 RX ORDER — ROSUVASTATIN CALCIUM 10 MG/1
10 TABLET, COATED ORAL NIGHTLY
Qty: 90 TABLET | Refills: 3 | Status: SHIPPED | OUTPATIENT
Start: 2019-10-22 | End: 2019-10-22

## 2019-10-22 RX ADMIN — SODIUM CHLORIDE: 0.45 INJECTION, SOLUTION INTRAVENOUS at 05:10

## 2019-10-22 RX ADMIN — LISINOPRIL 2.5 MG: 2.5 TABLET ORAL at 08:10

## 2019-10-22 RX ADMIN — PANTOPRAZOLE SODIUM 40 MG: 40 TABLET, DELAYED RELEASE ORAL at 05:10

## 2019-10-22 RX ADMIN — ASPIRIN 81 MG: 81 TABLET, DELAYED RELEASE ORAL at 08:10

## 2019-10-22 RX ADMIN — TICAGRELOR 90 MG: 90 TABLET ORAL at 09:10

## 2019-10-22 RX ADMIN — METOPROLOL SUCCINATE 25 MG: 25 TABLET, FILM COATED, EXTENDED RELEASE ORAL at 08:10

## 2019-10-22 NOTE — CARE UPDATE
10/21/19 2053   Patient Assessment/Suction   Level of Consciousness (AVPU) alert   PRE-TX-O2   O2 Device (Oxygen Therapy) room air   SpO2 97 %   Pulse Oximetry Type Continuous   $ Pulse Oximetry - Multiple Charge Pulse Oximetry - Multiple   Pulse 65   Resp 12

## 2019-10-22 NOTE — PROGRESS NOTES
Interval History: Mr. Chao is doing well. He denies chest pain or SOB. He has walked and denies any symptoms. Right radial site is soft no hematoma. Troponin peaked at 45.108 last night will recheck this am before discharge.         Review of Systems     Denies Chest pain, sob, or palpitations  No recent fever, cough chills or congestion  No blood in the urine or stool  No myalgias  No recent arm, neck, or jaw pain  No lightheadedness or dizziness  No Double vision, blurry, vision or headache     Objective:     Vital Signs (Most Recent):  Temp: 98.5 °F (36.9 °C) (10/22/19 0705)  Pulse: 70 (10/22/19 0805)  Resp: 17 (10/22/19 0805)  BP: (!) 140/66 (10/22/19 0705)  SpO2: 95 % (10/22/19 0805) Vital Signs (24h Range):  Temp:  [97.7 °F (36.5 °C)-98.5 °F (36.9 °C)] 98.5 °F (36.9 °C)  Pulse:  [55-96] 70  Resp:  [10-30] 17  SpO2:  [93 %-99 %] 95 %  BP: (104-176)/(51-82) 140/66     Weight: 113.5 kg (250 lb 3.6 oz)  Body mass index is 34.9 kg/m².     SpO2: 95 %  O2 Device (Oxygen Therapy): room air      Intake/Output Summary (Last 24 hours) at 10/22/2019 0942  Last data filed at 10/22/2019 0800  Gross per 24 hour   Intake 2321.18 ml   Output 800 ml   Net 1521.18 ml       Lines/Drains/Airways     Peripheral Intravenous Line                 Peripheral IV - Single Lumen 10/21/19 0410 20 G Right Forearm 1 day         Peripheral IV - Single Lumen 10/21/19 0540 18 G Left Forearm 1 day                   aspirin  81 mg Oral Daily    atorvastatin  80 mg Oral QHS    lisinopril  2.5 mg Oral Daily    metoprolol succinate  25 mg Oral Daily    pantoprazole  40 mg Oral Daily    ticagrelor  90 mg Oral BID         PHYSICAL EXAM:  Constitutional: Well built, well nourished male in no distress  Neck: no carotid bruit, no JVD, supple, symmetrical, trachea midline and hepatojugular reflux  Lungs: diminished breath sounds bibasilar and bilaterally, rhonchi bilaterally  Chest Wall: no tenderness  Heart: regular rate and rhythm, S1, S2  normal, no murmur, click, rub or gallop and regular rate and rhythm  Abdomen: soft, non-tender; bowel sounds normal; no masses,  no organomegaly  Extremities: Extremities normal, atraumatic, no cyanosis, clubbing, or edema-- right radial site soft no hematoma pulse is palpable  Skin: Skin color, texture, turgor normal. No rashes or lesions  Neuro: Alert and responsive. Grossly non focal    I HAVE REVIEWED :    The vital signs, nurses notes, and all the pertinent radiology and labs.       Significant Labs:       Results for CHERELLE PEARSON (MRN [id number]) as of 10/22/2019 09:43   Ref. Range 10/22/2019 03:22   Sodium Latest Ref Range: 136 - 145 mmol/L 136   Potassium Latest Ref Range: 3.5 - 5.1 mmol/L 4.1   Chloride Latest Ref Range: 95 - 110 mmol/L 104   CO2 Latest Ref Range: 23 - 29 mmol/L 26   Anion Gap Latest Ref Range: 8 - 16 mmol/L 6 (L)   BUN, Bld Latest Ref Range: 6 - 20 mg/dL 19   Creatinine Latest Ref Range: 0.5 - 1.4 mg/dL 1.3   eGFR if non African American Latest Ref Range: >60 mL/min/1.73 m^2 >60.0   eGFR if African American Latest Ref Range: >60 mL/min/1.73 m^2 >60.0   Glucose Latest Ref Range: 70 - 110 mg/dL 101   Calcium Latest Ref Range: 8.7 - 10.5 mg/dL 7.8 (L)   Magnesium Latest Ref Range: 1.6 - 2.6 mg/dL 1.6   Triglycerides Latest Ref Range: 30 - 150 mg/dL 116   Cholesterol Latest Ref Range: 120 - 199 mg/dL 134   HDL Latest Ref Range: 40 - 75 mg/dL 25 (L)   Hdl/Cholesterol Ratio Latest Ref Range: 20.0 - 50.0 % 18.7 (L)   LDL Cholesterol External Latest Ref Range: 63.0 - 159.0 mg/dL 85.8   Non-HDL Cholesterol Latest Units: mg/dL 109   Total Cholesterol/HDL Ratio Latest Ref Range: 2.0 - 5.0  5.4 (H)   Hemoglobin A1C External Latest Ref Range: 4.5 - 6.2 % 5.3   Estimated Avg Glucose Latest Ref Range: 68 - 131 mg/dL 105   TSH Latest Ref Range: 0.340 - 5.600 uIU/mL 1.340     Results for CHERELLE PEARSON (MRN [id number]) as of 10/22/2019 09:43   Ref. Range 10/21/2019 10:00 10/21/2019 16:34  10/21/2019 22:13   BNP Latest Ref Range: 0 - 99 pg/mL 16     Troponin I Latest Ref Range: 0.020 - 0.040 ng/mL 1.383 (HH) 47.403 (HH) 45.108 (HH)     Results for CHERELLE PEARSON (MRN [id number]) as of 10/22/2019 09:43   Ref. Range 10/21/2019 16:34   WBC Latest Ref Range: 3.90 - 12.70 K/uL 12.98 (H)   RBC Latest Ref Range: 4.60 - 6.20 M/uL 7.23 (H)   Hemoglobin Latest Ref Range: 14.0 - 18.0 g/dL 15.5   Hematocrit Latest Ref Range: 40.0 - 54.0 % 48.7   MCV Latest Ref Range: 82 - 98 fL 67 (L)   MCH Latest Ref Range: 27.0 - 31.0 pg 21.4 (L)   MCHC Latest Ref Range: 32.0 - 36.0 g/dL 31.8 (L)   RDW Latest Ref Range: 11.5 - 14.5 % 17.6 (H)   Platelets Latest Ref Range: 150 - 350 K/uL 208   MPV Latest Ref Range: 9.2 - 12.9 fL 9.6   Gran% Latest Ref Range: 38.0 - 73.0 % 85.6 (H)   Gran # (ANC) Latest Ref Range: 1.8 - 7.7 K/uL 11.1 (H)   Lymph% Latest Ref Range: 18.0 - 48.0 % 7.2 (L)   Lymph # Latest Ref Range: 1.0 - 4.8 K/uL 0.9 (L)   Mono% Latest Ref Range: 4.0 - 15.0 % 6.3   Mono # Latest Ref Range: 0.3 - 1.0 K/uL 0.8   Eosinophil% Latest Ref Range: 0.0 - 8.0 % 0.2   Eos # Latest Ref Range: 0.0 - 0.5 K/uL 0.0   Basophil% Latest Ref Range: 0.0 - 1.9 % 0.3   Baso # Latest Ref Range: 0.00 - 0.20 K/uL 0.04   nRBC Latest Ref Range: 0 /100 WBC 0   Differential Method Unknown Automated   Immature Grans (Abs) Latest Ref Range: 0.00 - 0.04 K/uL 0.05 (H)   Immature Granulocytes Latest Ref Range: 0.0 - 0.5 % 0.4       Significant Imaging:     · Moderate concentric left ventricular hypertrophy.  · Normal left ventricular systolic function. The estimated ejection fraction is 68%  · Normal LV diastolic function.  · Normal right ventricular systolic function.  · Mild left atrial enlargement.  · Intermediate central venous pressure (8 mm Hg).      Mid Cx to Dist Cx lesion with side branch in Ost 3rd Mrg   Stent - Main Branch   Drug-eluting stent was successfully placed. The stent used was a STENT LINCOLN SAROJ 2.25 X 18. Stent was  deployed by way of balloon expansion. Stent balloon inflated.   Post-Intervention Lesion Assessment   The intervention was successful. The guidewire crossed the lesion. Intentional subintimal strategy was not used. Diagnostic JEREMY flow is 0. Post-intervention JEREMY flow is 3. There were no complications.   There is a 0% residual stenosis in the main branch post intervention.   There is a 0% residual stenosis in the side branch post intervention.     Vent. Rate : 064 BPM     Atrial Rate : 064 BPM     P-R Int : 164 ms          QRS Dur : 094 ms      QT Int : 386 ms       P-R-T Axes : 071 062 061 degrees     QTc Int : 398 ms    Normal sinus rhythm  Normal ECG  When compared with ECG of 21-OCT-2019 13:39,  No significant change was found    I HAVE REVIEWED :    The vital signs, nurses notes, and all the pertinent radiology and labs.       ASSESSMENT & PLAN:     1. NSTEMI S/P LINCOLN SAROJ 2.25 X 18 to the Circumflex- continue brilinta 90 mg PO BID, ASA 81 mg daily, Metoprolol 25 mg daily, Crestor 10 mg daily and Lisinopril 2.5 mg daily and this can be titrated up to 5 mg as needed for bp control, he will need to keep bp log at home, recommend Cardiac rehab    2.  Dyslipidemia continue Crestor 10 mg daily monitor liver profile in in about 2 months time     3.  Low T levels currently on testosterone supplements.  Further usage may be need to be discussed with his urologist and primary care physician.     4.  Reactive hypertension needs aggressive control of his blood pressure to keep his systolic blood pressure 130 or less at all times and diastolic 90 or less.   May consider titrating the ACE inhibitors higher as blood pressure tolerates on outpatient basis  5.  Dyspepsia  Consider maintain on PPI agents for the time being  Extended discussions with the family that included activity limitations of further usage of testosterone as well as pathophysiology reviewed with the patient and his wife in detail.  They seem to  understand    Check Troponin if is trending down as expected ok for discharge home on current regimen  Scripts have been given.       I have personally interviewed and examined the patient, I have reviewed the Nurse Practitioner's history and physical, assessment, and plan. I have personally evaluated the patient at bedside and agree with the findings.

## 2019-10-22 NOTE — HPI
54-year-old gentleman history of hyperlipidemia and low testosterone who presents to the hospital with chest pain. The patient reports chest pain started yesterday.  He reports the pain was left-sided and moderate in intensity.  Yesterday the pain waxed and waned in intensity intermittently.  He took aspirin at home.  This morning the patient had recurrent chest pain that was severe in intensity and constant in timing.  Because it was more severe and persistent than prior episodes, he present to the emergency room for evaluation.  He reported some nausea and belching symptoms.  He denied fever, chills or productive cough.  No headache or bleeding.  No fever or chills.  He has strong family history of CAD.  He does not smoke, drink, or use illicit substances.  He maintain strict dietary measures and works out 5 days per week.  He denies having any prior symptoms of similar nature; he had 1 episode that was much less severe bout 2 years ago.  Initially the patient presented to outside facility where he was treated medically and diagnosed with NSTEMI.  Subsequently he was transferred to our facility for cardiology management.  The patient underwent angiography with placement of drug-eluting stent to distal left circ into OM 3.  Currently the patient reports feeling much better with resolution of chest pain symptoms.

## 2019-10-22 NOTE — PLAN OF CARE
10/22/19 1043   Discharge Assessment   Assessment Type Discharge Planning Assessment   Confirmed/corrected address and phone number on facesheet? Yes   Assessment information obtained from? Patient   Communicated expected length of stay with patient/caregiver yes   Prior to hospitilization cognitive status: Alert/Oriented   Prior to hospitalization functional status: Independent   Current cognitive status: Alert/Oriented   Current Functional Status: Independent   Lives With spouse  (Pt resides with his wife, Kaylin [phone])   Able to Return to Prior Arrangements yes   Is patient able to care for self after discharge? Yes   Who are your caregiver(s) and their phone number(s)? Pt's primary caregiver is his wife, Kalyin [phone].   Patient's perception of discharge disposition home or selfcare   Readmission Within the Last 30 Days no previous admission in last 30 days   Patient currently being followed by outpatient case management? No   Patient currently receives any other outside agency services? No   Equipment Currently Used at Home none   Do you have any problems affording any of your prescribed medications? No   Is the patient taking medications as prescribed? yes   Does the patient have transportation home? Yes   Transportation Anticipated family or friend will provide   Does the patient receive services at the [facility]? No   Discharge Plan A Home with family   Discharge Plan B Home with family   DME Needed Upon Discharge  none   Patient/Family in Agreement with Plan yes     SW met with pt and his wife, Kaylin, to complete discharge assessment. Pt reports no discharge needs at this time.

## 2019-10-22 NOTE — DISCHARGE SUMMARY
[facility]  Discharge Summary      Patient Name: Enrico Talbot  MRN: [id number]  Admission Date: 10/21/2019  Hospital Length of Stay: 1 days  Discharge Date and Time: 10/22/2019 11:45 AM  Attending Physician: No att. providers found   Discharging Provider: Nadya Coffey DO  Primary Care Provider: Primary Doctor Bernice      HPI:   54-year-old gentleman history of hyperlipidemia and low testosterone who presents to the hospital with chest pain. The patient reports chest pain started yesterday.  He reports the pain was left-sided and moderate in intensity.  Yesterday the pain waxed and waned in intensity intermittently.  He took aspirin at home.  This morning the patient had recurrent chest pain that was severe in intensity and constant in timing.  Because it was more severe and persistent than prior episodes, he present to the emergency room for evaluation.  He reported some nausea and belching symptoms.  He denied fever, chills or productive cough.  No headache or bleeding.  No fever or chills.  He has strong family history of CAD.  He does not smoke, drink, or use illicit substances.  He maintain strict dietary measures and works out 5 days per week.  He denies having any prior symptoms of similar nature; he had 1 episode that was much less severe bout 2 years ago.  Initially the patient presented to outside facility where he was treated medically and diagnosed with NSTEMI.  Subsequently he was transferred to our facility for cardiology management.  The patient underwent angiography with placement of drug-eluting stent to distal left circ into OM 3.  Currently the patient reports feeling much better with resolution of chest pain symptoms.     Procedure(s) (LRB):  Left heart cath and possible PCI (Right)  Stent, Drug Eluting, Single Vessel, Coronary  Left heart cath (Left)      Hospital Course:   Patient initially presented to [facility] complaining of chest pain. He was  "diagnosed with NSTEMI and transferred to [facility] for continued cardiac workup.  Troponin peak at 47.4 and down trended to 31.867 by time of discharge. Patient was brought to cath lab for angiogram.  Where drug-eluting stent was placed to circumflex artery.  Patient was monitored overnight with stable vitals and chest pain resolution.  Patient was discharged with Cardiology approval on following medications Brilinta 90 mg p.o. b.i.d., aspirin 81 mg q.day metoprolol 25 mg q.day Crestor 10 mg q.day and lisinopril 2.5 mg q.day to be increased to 5 mg as needed.  Patient will follow up with Cardiology as listed below.  Patient was seen and examined on day of discharge and deemed appropriate for discharge home..    Physical exam on day of discharge  /64   Pulse 75   Temp 98.5 °F (36.9 °C) (Oral)   Resp 19   Ht 5' 11" (1.803 m)   Wt 113.5 kg (250 lb 3.6 oz)   SpO2 97%   BMI 34.90 kg/m²   Constitutional:  Awake, alert and oriented x 3, NAD  HENT: ncat, mmm, perrla, eomi, neck normal rom and supple   Cardiovascular: RRR no mrg  Pulmonary/Chest: Effort normal, CTA b/l no wheezes, rales, rhonchi   Abdominal: Soft. +bs, ntnd  Musculoskeletal: Normal range of motion.  no edema, tenderness or deformity.   Neurological:  AAOx3, no focal deficits noted, CN II-XII grossly intact,  normal reflexes.   Skin: Skin is warm and dry. not diaphoretic.   Psychiatric:  normal mood and affect.  behavior is normal. Judgment and thought content normal.   Nursing note and vitals reviewed.      Consults:   Consults (From admission, onward)        Status Ordering Provider     Inpatient consult to Cardiology  Once     Provider:  Sanjeev Mejia MD    Completed MONTSE CRUM     Inpatient consult to Registered Dietitian/Nutritionist  Once     Provider:  (Not yet assigned)    Completed KALEIGH MCCALL          No new Assessment & Plan notes have been filed under this hospital service since the last note was " generated.  Service: Hospital Medicine    Final Active Diagnoses:    Diagnosis Date Noted POA    PRINCIPAL PROBLEM:  NSTEMI (non-ST elevated myocardial infarction) [I21.4] 10/21/2019 Yes      Problems Resolved During this Admission:       Discharged Condition: stable    Disposition: Home or Self Care    Follow Up:  Follow-up Information     Primary Doctor No.    Why:  recommend establish care with PCP           Schedule an appointment as soon as possible for a visit with Sanjeev Mejia MD.    Specialties:  Cardiovascular Disease, Cardiology  Contact information:  [address] [place] [address]  [address] [place] [id number]  [phone]                 Patient Instructions:      Diet Cardiac     Notify your health care provider if you experience any of the following:  temperature >100.4     Notify your health care provider if you experience any of the following:  persistent nausea and vomiting or diarrhea     Notify your health care provider if you experience any of the following:  redness, tenderness, or signs of infection (pain, swelling, redness, odor or green/yellow discharge around incision site)     Notify your health care provider if you experience any of the following:  difficulty breathing or increased cough     Activity as tolerated       Significant Diagnostic Studies: Labs:   Troponin   Recent Labs   Lab 10/22/19  0322   TROPONINI 31.867*   , A1C:   Recent Labs   Lab 10/22/19  0322   HGBA1C 5.3    and All labs within the past 24 hours have been reviewed    Pending Diagnostic Studies:     Procedure Component Value Units Date/Time    Troponin I #2 [[id number]] Collected:  10/21/19 1000    Order Status:  Sent Lab Status:  In process Updated:  10/21/19 1008    Specimen:  Blood          Medications:  Reconciled Home Medications:      Medication List      START taking these medications    aspirin 81 MG EC tablet  Commonly known as:  ECOTRIN  Take 1 tablet (81 mg total) by mouth once daily.  Start taking on:  October 23,  2019     lisinopril 2.5 MG tablet  Commonly known as:  PRINIVIL,ZESTRIL  Take 1 tablet (2.5 mg total) by mouth once daily.  Start taking on:  October 23, 2019     metoprolol succinate 25 MG 24 hr tablet  Commonly known as:  TOPROL-XL  Take 1 tablet (25 mg total) by mouth once daily.  Start taking on:  October 23, 2019     rosuvastatin 10 MG tablet  Commonly known as:  CRESTOR  Take 1 tablet (10 mg total) by mouth every evening.     ticagrelor 90 mg tablet  Commonly known as:  BRILINTA  Take 1 tablet (90 mg total) by mouth 2 (two) times daily.        CHANGE how you take these medications    testosterone cypionate 200 mg/mL injection  Commonly known as:  DEPOTESTOTERONE CYPIONATE  Inject 0.35 mL IM twice weekly.  What changed:    · how much to take  · how to take this  · when to take this  · additional instructions        CONTINUE taking these medications    LILLI MULTIVITAMIN FOR MEN ORAL  Take 2 tablets by mouth once daily.        STOP taking these medications    arginine (L-arginine) 500 mg Pwpk     CITRULLINE 1000 ORAL     CO Q-10 200 mg capsule  Generic drug:  coenzyme Q10     EMERGEN-C ORAL     fish oil-omega-3 fatty acids 300-1,000 mg capsule     krill oil 500 mg Cap     PRONUTRIENTS FRUIT-VEGGIE ORAL            Indwelling Lines/Drains at time of discharge:   Lines/Drains/Airways     None                 Time spent on the discharge of patient: 32 minutes  Patient was seen and examined on the date of discharge and determined to be suitable for discharge.         Nadya Coffey DO  Department of Hospital Medicine  [facility]

## 2019-10-22 NOTE — SUBJECTIVE & OBJECTIVE
Past Medical History:   Diagnosis Date    Anemia     AR (allergic rhinitis)        Past Surgical History:   Procedure Laterality Date    REFRACTIVE SURGERY  1998    SINUS SURGERY  1995       Review of patient's allergies indicates:   Allergen Reactions    Lactose intolerance [lactase]        Current Facility-Administered Medications on File Prior to Encounter   Medication    [COMPLETED] aspirin tablet 325 mg    [COMPLETED] heparin 25,000 units in dextrose 5% (100 units/ml) IV bolus from bag INITIAL BOLUS (max bolus 4000 units)    [COMPLETED] iohexol (OMNIPAQUE 350) injection 100 mL    [COMPLETED] morphine injection 6 mg    [COMPLETED] morphine injection 6 mg    [COMPLETED] nitroGLYCERIN 2% TD oint ointment 1 inch    [COMPLETED] sodium chloride 0.9% bolus 1,000 mL    [DISCONTINUED] heparin 25,000 units in dextrose 5% (100 units/ml) IV bolus from bag - ADDITIONAL PRN BOLUS - 30 units/kg (max bolus 4000 units)    [DISCONTINUED] heparin 25,000 units in dextrose 5% (100 units/ml) IV bolus from bag - ADDITIONAL PRN BOLUS - 60 units/kg (max bolus 4000 units)    [DISCONTINUED] heparin 25,000 units in dextrose 5% 250 mL (100 units/mL) infusion LOW INTENSITY nomogram - [facility]     Current Outpatient Medications on File Prior to Encounter   Medication Sig    arginine, L-arginine, 500 mg PwPk Take 500 mg by mouth once daily. PT MIXES INTO A DRINK DAILY    ascorbic acid/multivit-min (EMERGEN-C ORAL) Take 1 packet by mouth once daily.    CITRULLINE 1000 ORAL Take 1,000 mg by mouth once daily.    coenzyme Q10 (CO Q-10) 200 mg capsule Take 200 mg by mouth once daily.      DIET SUPP#21/CALCIUM PHOSPHATE (PRONUTRIENTS FRUIT-VEGGIE ORAL) Take 1 capsule by mouth once daily.      fish oil-omega-3 fatty acids 300-1,000 mg capsule Take 2 capsules by mouth 2 (two) times daily.     krill oil 500 mg Cap Take 500 mg by mouth 2 (two) times daily.    MV-MN/FA/LYCOPENE/LUT/HB#178 (LILLI MULTIVITAMIN FOR MEN ORAL) Take 2 tablets  by mouth once daily.      testosterone cypionate (DEPOTESTOTERONE CYPIONATE) 200 mg/mL injection Inject 0.35 mL IM twice weekly. (Patient taking differently: Inject 200 mg into the muscle every 7 days. )    [DISCONTINUED] ascorbic acid, vitamin C, (VITAMIN C) Powd Take 5,000 mg by mouth once daily.     [DISCONTINUED] IRON, FERROUS SULFATE, ORAL Take by mouth once daily.    [DISCONTINUED] lecithin 1,200 mg Chew Take 1 tablet by mouth once daily.       Family History     Problem Relation (Age of Onset)    Dementia Mother    Heart disease Father    Heart failure Father        Tobacco Use    Smoking status: Never Smoker    Smokeless tobacco: Never Used   Substance and Sexual Activity    Alcohol use: No     Comment: QUIT 1985    Drug use: No    Sexual activity: Yes     Partners: Female     Review of Systems complete 10 point review of systems negative except as per HPI  Objective:     Vital Signs (Most Recent):  Temp: 98.1 °F (36.7 °C) (10/21/19 1630)  Pulse: 71 (10/21/19 1815)  Resp: 13 (10/21/19 1815)  BP: (!) 112/56 (10/21/19 1815)  SpO2: 96 % (10/21/19 1815) Vital Signs (24h Range):  Temp:  [98.1 °F (36.7 °C)-99 °F (37.2 °C)] 98.1 °F (36.7 °C)  Pulse:  [55-96] 71  Resp:  [10-30] 13  SpO2:  [93 %-99 %] 96 %  BP: (112-211)/() 112/56     Weight: 111.1 kg (245 lb)  Body mass index is 34.17 kg/m².    Physical Exam  General:  Comfortable appearing, nontoxic, no apparent distress, well-nourished and well-developed  ENT:  Moist mucous membranes  Head and eyes:  Anicteric sclerae, no conjunctival discharge, PERRLA  Pulmonary:  Comfortable work of breathing, lungs clear auscultation bilaterally  Cardiovascular:  1+ right radial pulse with radial splint in place; site clean and intact without bleeding, ecchymoses, or tenderness to palpation, regular rate and rhythm  GI:  Abdomen soft and nontender, nondistended, normal bowel sounds  Skin:  Dry and warm no jaundice  Psych:  Mood is good, affect normal, insight  good  Neuro:  Nonfocal motor exam, alert and oriented, fluent speech       Significant Labs:   Cardiac Markers:   Recent Labs   Lab 10/21/19  1000   BNP 16     Coagulation:   Recent Labs   Lab 10/21/19  0410 10/21/19  1634   INR 1.1  --    APTT 28.0 28.4     Significant Imaging: I have reviewed and interpreted all pertinent imaging results/findings within the past 24 hours.     Chest x-ray personally reviewed:  Lung fields are clear without focal consolidation or pulmonary edema\    CTA negative for pulmonary emboli

## 2019-10-22 NOTE — DISCHARGE INSTRUCTIONS
Follow up with Dr. Mejia in one-two weeks Office number [phone]  
Duration Of Freeze Thaw-Cycle (Seconds): 5
Consent: The patient's consent was obtained including but not limited to risks of crusting, scabbing, blistering, scarring, darker or lighter pigmentary change, recurrence, incomplete removal and infection.
Render Post-Care Instructions In Note?: no
Number Of Freeze-Thaw Cycles: 2 freeze-thaw cycles
Post-Care Instructions: I reviewed with the patient in detail post-care instructions. Patient is to wear sunprotection, and avoid picking at any of the treated lesions. Pt may apply Vaseline to crusted or scabbing areas.
Detail Level: Simple

## 2019-10-22 NOTE — H&P
[facility]  History & Physical    Patient Name: Enrico Talbot  MRN: [id number]  Admission Date: 10/21/2019  Attending Physician: Yvon Hernandez MD   Primary Care Provider: Primary Doctor No  Date of service:  10/21/2019      Patient information was obtained from patient, spouse/SO, relative(s) and ER records.     Subjective:     Principal Problem:NSTEMI (non-ST elevated myocardial infarction)    Chief Complaint:   Chief Complaint   Patient presents with    Chest Pain     pt transferred for cardiology services from [facility]        HPI: 54-year-old gentleman history of hyperlipidemia and low testosterone who presents to the hospital with chest pain. The patient reports chest pain started yesterday.  He reports the pain was left-sided and moderate in intensity.  Yesterday the pain waxed and waned in intensity intermittently.  He took aspirin at home.  This morning the patient had recurrent chest pain that was severe in intensity and constant in timing.  Because it was more severe and persistent than prior episodes, he present to the emergency room for evaluation.  He reported some nausea and belching symptoms.  He denied fever, chills or productive cough.  No headache or bleeding.  No fever or chills.  He has strong family history of CAD.  He does not smoke, drink, or use illicit substances.  He maintain strict dietary measures and works out 5 days per week.  He denies having any prior symptoms of similar nature; he had 1 episode that was much less severe bout 2 years ago.  Initially the patient presented to outside facility where he was treated medically and diagnosed with NSTEMI.  Subsequently he was transferred to our facility for cardiology management.  The patient underwent angiography with placement of drug-eluting stent to distal left circ into OM 3.  Currently the patient reports feeling much better with resolution of chest pain symptoms.     Past Medical History:    Diagnosis Date    Anemia     AR (allergic rhinitis)        Past Surgical History:   Procedure Laterality Date    REFRACTIVE SURGERY  1998    SINUS SURGERY  1995       Review of patient's allergies indicates:   Allergen Reactions    Lactose intolerance [lactase]        Current Facility-Administered Medications on File Prior to Encounter   Medication    [COMPLETED] aspirin tablet 325 mg    [COMPLETED] heparin 25,000 units in dextrose 5% (100 units/ml) IV bolus from bag INITIAL BOLUS (max bolus 4000 units)    [COMPLETED] iohexol (OMNIPAQUE 350) injection 100 mL    [COMPLETED] morphine injection 6 mg    [COMPLETED] morphine injection 6 mg    [COMPLETED] nitroGLYCERIN 2% TD oint ointment 1 inch    [COMPLETED] sodium chloride 0.9% bolus 1,000 mL    [DISCONTINUED] heparin 25,000 units in dextrose 5% (100 units/ml) IV bolus from bag - ADDITIONAL PRN BOLUS - 30 units/kg (max bolus 4000 units)    [DISCONTINUED] heparin 25,000 units in dextrose 5% (100 units/ml) IV bolus from bag - ADDITIONAL PRN BOLUS - 60 units/kg (max bolus 4000 units)    [DISCONTINUED] heparin 25,000 units in dextrose 5% 250 mL (100 units/mL) infusion LOW INTENSITY nomogram - [facility]     Current Outpatient Medications on File Prior to Encounter   Medication Sig    arginine, L-arginine, 500 mg PwPk Take 500 mg by mouth once daily. PT MIXES INTO A DRINK DAILY    ascorbic acid/multivit-min (EMERGEN-C ORAL) Take 1 packet by mouth once daily.    CITRULLINE 1000 ORAL Take 1,000 mg by mouth once daily.    coenzyme Q10 (CO Q-10) 200 mg capsule Take 200 mg by mouth once daily.      DIET SUPP#21/CALCIUM PHOSPHATE (PRONUTRIENTS FRUIT-VEGGIE ORAL) Take 1 capsule by mouth once daily.      fish oil-omega-3 fatty acids 300-1,000 mg capsule Take 2 capsules by mouth 2 (two) times daily.     krill oil 500 mg Cap Take 500 mg by mouth 2 (two) times daily.    MV-MN/FA/LYCOPENE/LUT/HB#178 (LILLI MULTIVITAMIN FOR MEN ORAL) Take 2 tablets by mouth once daily.       testosterone cypionate (DEPOTESTOTERONE CYPIONATE) 200 mg/mL injection Inject 0.35 mL IM twice weekly. (Patient taking differently: Inject 200 mg into the muscle every 7 days. )    [DISCONTINUED] ascorbic acid, vitamin C, (VITAMIN C) Powd Take 5,000 mg by mouth once daily.     [DISCONTINUED] IRON, FERROUS SULFATE, ORAL Take by mouth once daily.    [DISCONTINUED] lecithin 1,200 mg Chew Take 1 tablet by mouth once daily.       Family History     Problem Relation (Age of Onset)    Dementia Mother    Heart disease Father    Heart failure Father        Tobacco Use    Smoking status: Never Smoker    Smokeless tobacco: Never Used   Substance and Sexual Activity    Alcohol use: No     Comment: QUIT 1985    Drug use: No    Sexual activity: Yes     Partners: Female     Review of Systems complete 10 point review of systems negative except as per HPI  Objective:     Vital Signs (Most Recent):  Temp: 98.1 °F (36.7 °C) (10/21/19 1630)  Pulse: 71 (10/21/19 1815)  Resp: 13 (10/21/19 1815)  BP: (!) 112/56 (10/21/19 1815)  SpO2: 96 % (10/21/19 1815) Vital Signs (24h Range):  Temp:  [98.1 °F (36.7 °C)-99 °F (37.2 °C)] 98.1 °F (36.7 °C)  Pulse:  [55-96] 71  Resp:  [10-30] 13  SpO2:  [93 %-99 %] 96 %  BP: (112-211)/() 112/56     Weight: 111.1 kg (245 lb)  Body mass index is 34.17 kg/m².    Physical Exam  General:  Comfortable appearing, nontoxic, no apparent distress, well-nourished and well-developed  ENT:  Moist mucous membranes  Head and eyes:  Anicteric sclerae, no conjunctival discharge, PERRLA  Pulmonary:  Comfortable work of breathing, lungs clear auscultation bilaterally  Cardiovascular:  1+ right radial pulse with radial splint in place; site clean and intact without bleeding, ecchymoses, or tenderness to palpation, regular rate and rhythm  GI:  Abdomen soft and nontender, nondistended, normal bowel sounds  Skin:  Dry and warm no jaundice  Psych:  Mood is good, affect normal, insight good  Neuro:  Nonfocal  motor exam, alert and oriented, fluent speech       Significant Labs:   Cardiac Markers:   Recent Labs   Lab 10/21/19  1000   BNP 16     Coagulation:   Recent Labs   Lab 10/21/19  0410 10/21/19  1634   INR 1.1  --    APTT 28.0 28.4     Significant Imaging: I have reviewed and interpreted all pertinent imaging results/findings within the past 24 hours.     Chest x-ray personally reviewed:  Lung fields are clear without focal consolidation or pulmonary edema\    CTA negative for pulmonary emboli    Assessment/Plan:     Assessment:  NSTEMI s/p SAROJ to OM3  Hyperlipidemia  Low testosterone  Chronic allergic rhinitis  Dyspepsia    Plan:  Inpatient admission to cardiology unit  Appreciate Cardiology. I discussed the case with Dr Hale.   Medical management including Brilinta, statin, aspirin, Ace, and beta-blocker.  Repeat a.m. labs.  Check echocardiogram.  Check A1c, TSH, and lipid profile.  Nutrition consultation for education of dietary changes  Start Protonix 40 mg p.o. daily  Continue supportive care, pain control, and antiemetics as needed  Continue medications for chronic issues as able; discuss resumption of testosterone with Cardiology  VTE prophylaxis via dual anti-platelet therapy and recent upper administration; avoid additional anticoagulation for due to increased bleeding risk  This patient is high risk secondary to acute illness with risk to life from acute MI    VTE Risk Mitigation (From admission, onward)         Ordered     Place sequential compression device  Until discontinued      10/21/19 1403     IP VTE HIGH RISK PATIENT  Once      10/21/19 1403     Reason for No Pharmacological VTE Prophylaxis  Once      10/21/19 1403               Yvon Hernandez MD  Department of Hospital Medicine   [facility]

## 2019-10-22 NOTE — PROGRESS NOTES
Cardiac Rehab     Enrico Talbot   [id number]   10/22/2019         Cardiac Rehab Phase Taught: Phase 1    Teaching Method: Verbal    Handouts: Cardiac Rehab Tear Sheet, [facility] Card and Heart Attack Booklet    Educational Videos: None    Understanding:  Learning indicated by feedback and Verbalize understanding    Comments: Education on heart attack, stents, medications( brilinta), heart healthy diet, and cardiac rehab provided to patient and spouse. Questions answered.      Total Time Spent: 30 mins            Pennie Hollingsworth RN

## 2019-10-22 NOTE — CONSULTS
[facility]  Adult Nutrition  Progress Note    SUMMARY     Nutrition Education:  · Educated Pt on Heart Healthy Diet. Discussed the importance of low sodium diet, choosing healthy fats, appropriate fluid intake, foods recommended and not recommended, and grocery shopping and cooking tips. Offered salt substitute recommendations and a sample menu. Pt has been following a heart healthy diet for the last 2 years. Does not cook with salt, chooses leaner cuts of meats, incorporate fruits and vegetables in his diet. Exercises 5x/week. RD believes this patient will be compliant and will not have any issues straying from diet. Patient is motivated to live a healthy lifestyle. Wife is supportive. Provided education materials and contact information.      Weight History:  Wt Readings from Last 10 Encounters:   10/22/19 113.5 kg (250 lb 3.6 oz)   10/21/19 111.1 kg (245 lb)   10/21/19 111.1 kg (245 lb)   08/02/19 112.8 kg (248 lb 10.9 oz)   02/23/18 110.2 kg (242 lb 15.2 oz)   08/22/17 111.1 kg (244 lb 14.9 oz)   05/22/17 111.7 kg (246 lb 4.1 oz)   02/21/17 113.5 kg (250 lb 3.6 oz)   10/21/16 115.7 kg (255 lb 1.2 oz)   07/15/16 112.7 kg (248 lb 7.3 oz)     Lab/Procedures/Meds: Pertinent Labs Reviewed  Clinical Chemistry:  Recent Labs   Lab 10/21/19  1000 10/22/19  0322    136   K 4.4 4.1    104   CO2 26 26   * 101   BUN 18 19   CREATININE 1.3 1.3   CALCIUM 8.1* 7.8*   PROT 6.4  --    ALBUMIN 3.9  --    BILITOT 1.5*  --    ALKPHOS 44*  --    AST 62*  --    ALT 42  --    ANIONGAP 8 6*   ESTGFRAFRICA >60.0 >60.0   EGFRNONAA >60.0 >60.0   MG  --  1.6     CBC:   Recent Labs   Lab 10/21/19  1634   WBC 12.98*   RBC 7.23*   HGB 15.5   HCT 48.7      MCV 67*   MCH 21.4*   MCHC 31.8*     Lipid Panel:  Recent Labs   Lab 10/22/19  0322   CHOL 134   HDL 25*   LDLCALC 85.8   TRIG 116   CHOLHDL 18.7*     Cardiac Profile:  Recent Labs   Lab 10/21/19  0410 10/21/19  1000 10/21/19  2213 10/22/19  0322   BNP  <10 16  --   --    TROPONINI 0.646* 1.383* 45.108* 31.867*    < > = values in this interval not displayed.       RD Follow-Up? No  Sylvia Curiel RD 10/22/2019 10:40 AM

## 2019-10-23 ENCOUNTER — TELEPHONE (OUTPATIENT)
Dept: CARDIAC REHAB | Facility: HOSPITAL | Age: 54
End: 2019-10-23

## 2019-10-23 NOTE — TELEPHONE ENCOUNTER
Enrico Talbot   [id number]   10/23/2019         Spoke with: no answer    Received Medications?:not applicable    Follow Up Appt?:not applicable    Cardio Pulmonary Rehab Appt?:not applicable    Comments: no answer. Pt info sent to cardiac rehab for follow up    Sissy Sexton RN

## 2019-10-29 ENCOUNTER — CLINICAL SUPPORT (OUTPATIENT)
Dept: CARDIAC REHAB | Facility: HOSPITAL | Age: 54
End: 2019-10-29
Attending: INTERNAL MEDICINE
Payer: COMMERCIAL

## 2019-10-29 DIAGNOSIS — Z95.5 S/P CORONARY ARTERY STENT PLACEMENT: Primary | ICD-10-CM

## 2019-10-29 PROCEDURE — 93797 PHYS/QHP OP CAR RHAB WO ECG: CPT

## 2019-10-31 ENCOUNTER — CLINICAL SUPPORT (OUTPATIENT)
Dept: CARDIAC REHAB | Facility: HOSPITAL | Age: 54
End: 2019-10-31
Attending: INTERNAL MEDICINE
Payer: COMMERCIAL

## 2019-10-31 DIAGNOSIS — Z95.5 S/P CORONARY ARTERY STENT PLACEMENT: ICD-10-CM

## 2019-10-31 PROCEDURE — 93798 PHYS/QHP OP CAR RHAB W/ECG: CPT

## 2019-11-01 ENCOUNTER — OFFICE VISIT (OUTPATIENT)
Dept: ORTHOPEDICS | Facility: CLINIC | Age: 54
End: 2019-11-01
Payer: COMMERCIAL

## 2019-11-01 ENCOUNTER — HOSPITAL ENCOUNTER (OUTPATIENT)
Dept: RADIOLOGY | Facility: HOSPITAL | Age: 54
Discharge: HOME | End: 2019-11-01
Attending: ORTHOPAEDIC SURGERY
Payer: COMMERCIAL

## 2019-11-01 VITALS — WEIGHT: 250 LBS | HEIGHT: 71 IN | BODY MASS INDEX: 35 KG/M2

## 2019-11-01 DIAGNOSIS — M25.571 RIGHT ANKLE PAIN, UNSPECIFIED CHRONICITY: Primary | ICD-10-CM

## 2019-11-01 DIAGNOSIS — M79.671 RIGHT FOOT PAIN: Primary | ICD-10-CM

## 2019-11-01 DIAGNOSIS — M25.571 RIGHT ANKLE PAIN, UNSPECIFIED CHRONICITY: ICD-10-CM

## 2019-11-01 PROCEDURE — 73630 X-RAY EXAM OF FOOT: CPT | Mod: 26,RT,, | Performed by: RADIOLOGY

## 2019-11-01 PROCEDURE — 97760 ORTHOTIC MGMT&TRAING 1ST ENC: CPT | Mod: S$GLB,,, | Performed by: ORTHOPAEDIC SURGERY

## 2019-11-01 PROCEDURE — 99203 PR OFFICE/OUTPT VISIT, NEW, LEVL III, 30-44 MIN: ICD-10-PCS | Mod: 25,S$GLB,, | Performed by: ORTHOPAEDIC SURGERY

## 2019-11-01 PROCEDURE — 73630 XR FOOT COMPLETE 3 VIEW RIGHT: ICD-10-PCS | Mod: 26,RT,, | Performed by: RADIOLOGY

## 2019-11-01 PROCEDURE — 99999 PR PBB SHADOW E&M-EST. PATIENT-LVL II: CPT | Mod: PBBFAC,,, | Performed by: ORTHOPAEDIC SURGERY

## 2019-11-01 PROCEDURE — 73610 X-RAY EXAM OF ANKLE: CPT | Mod: 26,RT,, | Performed by: RADIOLOGY

## 2019-11-01 PROCEDURE — 97760 PR ORTHOTIC MGMT&TRAINJ INITIAL ENC EA 15 MINS: ICD-10-PCS | Mod: S$GLB,,, | Performed by: ORTHOPAEDIC SURGERY

## 2019-11-01 PROCEDURE — 3008F BODY MASS INDEX DOCD: CPT | Mod: CPTII,S$GLB,, | Performed by: ORTHOPAEDIC SURGERY

## 2019-11-01 PROCEDURE — 73610 X-RAY EXAM OF ANKLE: CPT | Mod: TC,PO,RT

## 2019-11-01 PROCEDURE — 3008F PR BODY MASS INDEX (BMI) DOCUMENTED: ICD-10-PCS | Mod: CPTII,S$GLB,, | Performed by: ORTHOPAEDIC SURGERY

## 2019-11-01 PROCEDURE — 73630 X-RAY EXAM OF FOOT: CPT | Mod: TC,PO,RT

## 2019-11-01 PROCEDURE — 99999 PR PBB SHADOW E&M-EST. PATIENT-LVL II: ICD-10-PCS | Mod: PBBFAC,,, | Performed by: ORTHOPAEDIC SURGERY

## 2019-11-01 PROCEDURE — 73610 XR ANKLE COMPLETE 3 VIEW RIGHT: ICD-10-PCS | Mod: 26,RT,, | Performed by: RADIOLOGY

## 2019-11-01 PROCEDURE — 99203 OFFICE O/P NEW LOW 30 MIN: CPT | Mod: 25,S$GLB,, | Performed by: ORTHOPAEDIC SURGERY

## 2019-11-01 NOTE — PROGRESS NOTES
HISTORY OF PRESENT ILLNESS:  54 years old, with pain in his right heel for about   a year and then just yesterday at a trampoline park, he hit his foot while   trying to runoff the wall.  He also ran on the treadmill earlier that day and   strained his calf.    PHYSICAL EXAMINATION:  Today shows that he has tenderness at the plantar aspect   of the heel without signs of infection.  Skin is intact.  Compartments are soft.    X-rays are negative.    ASSESSMENT:  Plantar fasciitis.    PLAN:  ViscoHeel, night splints, heel cord stretching, literature on plantar   fasciitis.  Follow up as needed.        PBB/HN  dd: 11/01/2019 12:08:33 (CDT)  td: 11/02/2019 02:13:13 (CDT)  Doc ID   #[id number]  Job ID #[id number]    CC:     Pain worse with few few steps in the morning    We performed a custom orthotic/brace fitting, adjusting and training with the patient. The patient demonstrated understanding and proper care. This was performed for 15 minutes.    Further History  Aching pain  Worse with activity  Relieved with rest  No other associated symptoms  No other radiation    Further Exam  Alert and oriented  Pleasant  Contralateral limb has appropriate range of motion for age and condition  Contralateral limb has appropriate strength for age and condition  Contralateral limb has appropriate stability  for age and condition  No adenopathy  Pulses are appropriate for current condition  Skin is intact        Chief Complaint    Chief Complaint   Patient presents with    Right Foot - Pain       HPI  Enrico Talbot is a 54 y.o.  male who presents with       Past Medical History  Past Medical History:   Diagnosis Date    Anemia     AR (allergic rhinitis)        Past Surgical History  Past Surgical History:   Procedure Laterality Date    CORONARY ANGIOGRAPHY Right 10/21/2019    Procedure: Left heart cath and possible PCI;  Surgeon: Scooby Mejia MD;  Location: [facility] CATH/EP LAB;  Service: Cardiology;  Laterality: Right;     LEFT HEART CATHETERIZATION Left 10/21/2019    Procedure: Left heart cath;  Surgeon: Scooby Mejia MD;  Location: [facility] CATH/EP LAB;  Service: Cardiology;  Laterality: Left;    REFRACTIVE SURGERY  1998    SINUS SURGERY  1995       Medications  Current Outpatient Medications   Medication Sig    aspirin (ECOTRIN) 81 MG EC tablet Take 1 tablet (81 mg total) by mouth once daily.    lisinopril (PRINIVIL,ZESTRIL) 2.5 MG tablet Take 1 tablet (2.5 mg total) by mouth once daily.    metoprolol succinate (TOPROL-XL) 25 MG 24 hr tablet Take 1 tablet (25 mg total) by mouth once daily.    MV-MN/FA/LYCOPENE/LUT/HB#178 (LILLI MULTIVITAMIN FOR MEN ORAL) Take 2 tablets by mouth once daily.      rosuvastatin (CRESTOR) 10 MG tablet Take 1 tablet (10 mg total) by mouth every evening.    testosterone cypionate (DEPOTESTOTERONE CYPIONATE) 200 mg/mL injection Inject 0.35 mL IM twice weekly. (Patient taking differently: Inject 200 mg into the muscle every 7 days. )    ticagrelor (BRILINTA) 90 mg tablet Take 1 tablet (90 mg total) by mouth 2 (two) times daily.     No current facility-administered medications for this visit.        Allergies  Review of patient's allergies indicates:   Allergen Reactions    Lactose intolerance [lactase]        Family History  Family History   Problem Relation Age of Onset    Dementia Mother     Heart disease Father         first MI at 63    Heart failure Father        Social History  Social History     Socioeconomic History    Marital status:      Spouse name: Not on file    Number of children: 4    Years of education: Not on file    Highest education level: Not on file   Occupational History    Not on file   Social Needs    Financial resource strain: Not on file    Food insecurity:     Worry: Not on file     Inability: Not on file    Transportation needs:     Medical: Not on file     Non-medical: Not on file   Tobacco Use    Smoking status: Never Smoker    Smokeless tobacco:  Never Used   Substance and Sexual Activity    Alcohol use: No     Comment: QUIT 1985    Drug use: No    Sexual activity: Yes     Partners: Female   Lifestyle    Physical activity:     Days per week: Not on file     Minutes per session: Not on file    Stress: Not on file   Relationships    Social connections:     Talks on phone: Not on file     Gets together: Not on file     Attends Church service: Not on file     Active member of club or organization: Not on file     Attends meetings of clubs or organizations: Not on file     Relationship status: Not on file   Other Topics Concern    Not on file   Social History Narrative    The patient does not exercise regularly (recentlky began exercising again).    Rates diet as good.    He is not satisfied with weight.                       Review of Systems     Constitutional: Negative    HENT: Negative  Eyes: Negative  Respiratory: Negative  Cardiovascular: Negative  Musculoskeletal: HPI  Skin: Negative  Neurological: Negative  Hematological: Negative  Endocrine: Negative                 Physical Exam    There were no vitals filed for this visit.  Body mass index is 34.87 kg/m².  Physical Examination:     General appearance -  well appearing, and in no distress  Mental status - awake  Neck - supple  Chest -  symmetric air entry  Heart - normal rate   Abdomen - soft      Assessment     1. Right foot pain          Plan

## 2019-11-20 DIAGNOSIS — G47.9 FATIGUE DUE TO SLEEP PATTERN DISTURBANCE: ICD-10-CM

## 2019-11-20 DIAGNOSIS — R53.83 FATIGUE DUE TO SLEEP PATTERN DISTURBANCE: ICD-10-CM

## 2019-11-20 DIAGNOSIS — G47.33 OSA (OBSTRUCTIVE SLEEP APNEA): Primary | ICD-10-CM

## 2019-11-20 DIAGNOSIS — G47.19 EXCESSIVE DAYTIME SLEEPINESS: ICD-10-CM

## 2019-11-20 DIAGNOSIS — R06.83 SNORING: ICD-10-CM

## 2019-12-12 ENCOUNTER — PROCEDURE VISIT (OUTPATIENT)
Dept: SLEEP MEDICINE | Facility: HOSPITAL | Age: 54
End: 2019-12-12
Attending: INTERNAL MEDICINE
Payer: COMMERCIAL

## 2019-12-12 DIAGNOSIS — G47.9 FATIGUE DUE TO SLEEP PATTERN DISTURBANCE: ICD-10-CM

## 2019-12-12 DIAGNOSIS — R53.83 FATIGUE DUE TO SLEEP PATTERN DISTURBANCE: ICD-10-CM

## 2019-12-12 DIAGNOSIS — G47.33 OSA (OBSTRUCTIVE SLEEP APNEA): ICD-10-CM

## 2019-12-12 DIAGNOSIS — R06.83 SNORING: ICD-10-CM

## 2019-12-12 DIAGNOSIS — G47.19 EXCESSIVE DAYTIME SLEEPINESS: ICD-10-CM

## 2019-12-12 PROCEDURE — 95811 POLYSOM 6/>YRS CPAP 4/> PARM: CPT

## 2020-02-04 ENCOUNTER — LAB VISIT (OUTPATIENT)
Dept: LAB | Facility: HOSPITAL | Age: 55
End: 2020-02-04
Attending: INTERNAL MEDICINE
Payer: COMMERCIAL

## 2020-02-04 DIAGNOSIS — I21.4 ACUTE MYOCARDIAL INFARCTION, SUBENDOCARDIAL INFARCTION, INITIAL EPISODE OF CARE: ICD-10-CM

## 2020-02-04 DIAGNOSIS — Z00.00 ROUTINE GENERAL MEDICAL EXAMINATION AT A HEALTH CARE FACILITY: ICD-10-CM

## 2020-02-04 DIAGNOSIS — I10 ESSENTIAL HYPERTENSION, MALIGNANT: ICD-10-CM

## 2020-02-04 DIAGNOSIS — E78.5 HYPERLIPEMIA: Primary | ICD-10-CM

## 2020-02-04 LAB
ALBUMIN SERPL BCP-MCNC: 4.1 G/DL (ref 3.5–5.2)
ALP SERPL-CCNC: 69 U/L (ref 55–135)
ALT SERPL W/O P-5'-P-CCNC: 52 U/L (ref 10–44)
ANION GAP SERPL CALC-SCNC: 7 MMOL/L (ref 8–16)
AST SERPL-CCNC: 56 U/L (ref 10–40)
BASOPHILS # BLD AUTO: 0.06 K/UL (ref 0–0.2)
BASOPHILS NFR BLD: 0.9 % (ref 0–1.9)
BILIRUB SERPL-MCNC: 1.3 MG/DL (ref 0.1–1)
BUN SERPL-MCNC: 18 MG/DL (ref 6–20)
CALCIUM SERPL-MCNC: 9.5 MG/DL (ref 8.7–10.5)
CHLORIDE SERPL-SCNC: 104 MMOL/L (ref 95–110)
CHOLEST SERPL-MCNC: 96 MG/DL (ref 120–199)
CHOLEST/HDLC SERPL: 3.1 {RATIO} (ref 2–5)
CO2 SERPL-SCNC: 29 MMOL/L (ref 23–29)
CREAT SERPL-MCNC: 1.5 MG/DL (ref 0.5–1.4)
DIFFERENTIAL METHOD: ABNORMAL
EOSINOPHIL # BLD AUTO: 0.2 K/UL (ref 0–0.5)
EOSINOPHIL NFR BLD: 2.2 % (ref 0–8)
ERYTHROCYTE [DISTWIDTH] IN BLOOD BY AUTOMATED COUNT: 18.3 % (ref 11.5–14.5)
EST. GFR  (AFRICAN AMERICAN): 59.7 ML/MIN/1.73 M^2
EST. GFR  (NON AFRICAN AMERICAN): 51.7 ML/MIN/1.73 M^2
GLUCOSE SERPL-MCNC: 113 MG/DL (ref 70–110)
HCT VFR BLD AUTO: 51.2 % (ref 40–54)
HDLC SERPL-MCNC: 31 MG/DL (ref 40–75)
HDLC SERPL: 32.3 % (ref 20–50)
HGB BLD-MCNC: 15.3 G/DL (ref 14–18)
IMM GRANULOCYTES # BLD AUTO: 0.03 K/UL (ref 0–0.04)
IMM GRANULOCYTES NFR BLD AUTO: 0.4 % (ref 0–0.5)
LDLC SERPL CALC-MCNC: 42.8 MG/DL (ref 63–159)
LYMPHOCYTES # BLD AUTO: 1.5 K/UL (ref 1–4.8)
LYMPHOCYTES NFR BLD: 21.8 % (ref 18–48)
MCH RBC QN AUTO: 21 PG (ref 27–31)
MCHC RBC AUTO-ENTMCNC: 29.9 G/DL (ref 32–36)
MCV RBC AUTO: 70 FL (ref 82–98)
MONOCYTES # BLD AUTO: 0.6 K/UL (ref 0.3–1)
MONOCYTES NFR BLD: 8.4 % (ref 4–15)
NEUTROPHILS # BLD AUTO: 4.5 K/UL (ref 1.8–7.7)
NEUTROPHILS NFR BLD: 66.3 % (ref 38–73)
NONHDLC SERPL-MCNC: 65 MG/DL
NRBC BLD-RTO: 0 /100 WBC
PLATELET # BLD AUTO: 179 K/UL (ref 150–350)
PMV BLD AUTO: 10.4 FL (ref 9.2–12.9)
POTASSIUM SERPL-SCNC: 4.6 MMOL/L (ref 3.5–5.1)
PROT SERPL-MCNC: 7 G/DL (ref 6–8.4)
RBC # BLD AUTO: 7.28 M/UL (ref 4.6–6.2)
SODIUM SERPL-SCNC: 140 MMOL/L (ref 136–145)
TRIGL SERPL-MCNC: 111 MG/DL (ref 30–150)
WBC # BLD AUTO: 6.8 K/UL (ref 3.9–12.7)

## 2020-02-04 PROCEDURE — 36415 COLL VENOUS BLD VENIPUNCTURE: CPT | Mod: PO

## 2020-02-04 PROCEDURE — 85025 COMPLETE CBC W/AUTO DIFF WBC: CPT

## 2020-02-04 PROCEDURE — 80053 COMPREHEN METABOLIC PANEL: CPT

## 2020-02-04 PROCEDURE — 80061 LIPID PANEL: CPT

## 2020-08-22 ENCOUNTER — LAB VISIT (OUTPATIENT)
Dept: LAB | Facility: HOSPITAL | Age: 55
End: 2020-08-22
Attending: INTERNAL MEDICINE
Payer: COMMERCIAL

## 2020-08-22 DIAGNOSIS — I25.10 CORONARY ATHEROSCLEROSIS OF NATIVE CORONARY ARTERY: ICD-10-CM

## 2020-08-22 DIAGNOSIS — R79.89 HYPOURICEMIA: ICD-10-CM

## 2020-08-22 DIAGNOSIS — I21.4 ACUTE MYOCARDIAL INFARCTION, SUBENDOCARDIAL INFARCTION, INITIAL EPISODE OF CARE: ICD-10-CM

## 2020-08-22 DIAGNOSIS — Z11.59 SCREENING EXAMINATION FOR POLIOMYELITIS: ICD-10-CM

## 2020-08-22 DIAGNOSIS — E78.5 HYPERLIPEMIA: Primary | ICD-10-CM

## 2020-08-22 LAB
ALBUMIN SERPL BCP-MCNC: 4 G/DL (ref 3.5–5.2)
ALP SERPL-CCNC: 63 U/L (ref 55–135)
ALT SERPL W/O P-5'-P-CCNC: 42 U/L (ref 10–44)
ANION GAP SERPL CALC-SCNC: 6 MMOL/L (ref 8–16)
AST SERPL-CCNC: 50 U/L (ref 10–40)
BASOPHILS # BLD AUTO: 0.04 K/UL (ref 0–0.2)
BASOPHILS NFR BLD: 0.6 % (ref 0–1.9)
BILIRUB SERPL-MCNC: 1.4 MG/DL (ref 0.1–1)
BUN SERPL-MCNC: 18 MG/DL (ref 6–20)
CALCIUM SERPL-MCNC: 9 MG/DL (ref 8.7–10.5)
CHLORIDE SERPL-SCNC: 105 MMOL/L (ref 95–110)
CHOLEST SERPL-MCNC: 84 MG/DL (ref 120–199)
CHOLEST/HDLC SERPL: 3 {RATIO} (ref 2–5)
CO2 SERPL-SCNC: 27 MMOL/L (ref 23–29)
CREAT SERPL-MCNC: 1.3 MG/DL (ref 0.5–1.4)
DIFFERENTIAL METHOD: ABNORMAL
EOSINOPHIL # BLD AUTO: 0.2 K/UL (ref 0–0.5)
EOSINOPHIL NFR BLD: 2.7 % (ref 0–8)
ERYTHROCYTE [DISTWIDTH] IN BLOOD BY AUTOMATED COUNT: 17.7 % (ref 11.5–14.5)
EST. GFR  (AFRICAN AMERICAN): >60 ML/MIN/1.73 M^2
EST. GFR  (NON AFRICAN AMERICAN): >60 ML/MIN/1.73 M^2
FOLATE SERPL-MCNC: 18.4 NG/ML (ref 4–24)
GLUCOSE SERPL-MCNC: 110 MG/DL (ref 70–110)
HCT VFR BLD AUTO: 50 % (ref 40–54)
HDLC SERPL-MCNC: 28 MG/DL (ref 40–75)
HDLC SERPL: 33.3 % (ref 20–50)
HGB BLD-MCNC: 15.4 G/DL (ref 14–18)
IMM GRANULOCYTES # BLD AUTO: 0.01 K/UL (ref 0–0.04)
IMM GRANULOCYTES NFR BLD AUTO: 0.2 % (ref 0–0.5)
LDLC SERPL CALC-MCNC: 33 MG/DL (ref 63–159)
LYMPHOCYTES # BLD AUTO: 1.5 K/UL (ref 1–4.8)
LYMPHOCYTES NFR BLD: 24.6 % (ref 18–48)
MCH RBC QN AUTO: 21.8 PG (ref 27–31)
MCHC RBC AUTO-ENTMCNC: 30.8 G/DL (ref 32–36)
MCV RBC AUTO: 71 FL (ref 82–98)
MONOCYTES # BLD AUTO: 0.5 K/UL (ref 0.3–1)
MONOCYTES NFR BLD: 8.7 % (ref 4–15)
NEUTROPHILS # BLD AUTO: 3.9 K/UL (ref 1.8–7.7)
NEUTROPHILS NFR BLD: 63.2 % (ref 38–73)
NONHDLC SERPL-MCNC: 56 MG/DL
NRBC BLD-RTO: 0 /100 WBC
PLATELET # BLD AUTO: 157 K/UL (ref 150–350)
PMV BLD AUTO: 9.1 FL (ref 9.2–12.9)
POTASSIUM SERPL-SCNC: 4.5 MMOL/L (ref 3.5–5.1)
PROT SERPL-MCNC: 6.8 G/DL (ref 6–8.4)
RBC # BLD AUTO: 7.08 M/UL (ref 4.6–6.2)
SODIUM SERPL-SCNC: 138 MMOL/L (ref 136–145)
TESTOST SERPL-MCNC: 730 NG/DL (ref 304–1227)
TRIGL SERPL-MCNC: 115 MG/DL (ref 30–150)
WBC # BLD AUTO: 6.21 K/UL (ref 3.9–12.7)

## 2020-08-22 PROCEDURE — 80061 LIPID PANEL: CPT

## 2020-08-22 PROCEDURE — 36415 COLL VENOUS BLD VENIPUNCTURE: CPT | Mod: PO

## 2020-08-22 PROCEDURE — 85025 COMPLETE CBC W/AUTO DIFF WBC: CPT

## 2020-08-22 PROCEDURE — 80053 COMPREHEN METABOLIC PANEL: CPT

## 2020-08-22 PROCEDURE — 84403 ASSAY OF TOTAL TESTOSTERONE: CPT

## 2020-08-22 PROCEDURE — 82746 ASSAY OF FOLIC ACID SERUM: CPT

## 2020-09-17 ENCOUNTER — TELEPHONE (OUTPATIENT)
Dept: CARDIOLOGY | Facility: CLINIC | Age: 55
End: 2020-09-17

## 2020-09-17 DIAGNOSIS — Z01.84 ENCOUNTER FOR ANTIBODY RESPONSE EXAMINATION: ICD-10-CM

## 2020-09-17 NOTE — TELEPHONE ENCOUNTER
Both sons had it and he had been around it and daughter had it but he has not had been.  Him and wife never got sick.  Just curious if had it has been around a lot of people with positive and has not been sick.  Not sure what to order can you order it please         ----- Message from Scooby Mejia MD sent at 9/16/2020  6:22 PM CDT -----  Regarding: FW: antibody test    ----- Message -----  From: Toño Patrick  Sent: 8/31/2020  10:59 AM CDT  To: Scooby Mejia MD  Subject: antibody test                                    Pt is requesting lab order for antibody test   Please call pt to discuss [phone]

## 2020-11-10 RX ORDER — ROSUVASTATIN CALCIUM 10 MG/1
10 TABLET, COATED ORAL NIGHTLY
Qty: 90 TABLET | Refills: 3 | OUTPATIENT
Start: 2020-11-10 | End: 2020-11-12 | Stop reason: SDUPTHER

## 2020-11-10 NOTE — TELEPHONE ENCOUNTER
----- Message from Cheryl Damon MA sent at 11/10/2020 11:30 AM CST -----  Type:  RX Refill Request    Who Called:  Enrico Yip or New Rx:  refill  RX Name and Strength:    rosuvastatin (CRESTOR) 10 MG tablet 90 tablet 3 10/22/2019 10/21/2020   Sig - Route: Take 1 tablet (10 mg total) by mouth every evening. - Oral   How is the patient currently taking it? (ex. 1XDay):  see above  Is this a 30 day or 90 day RX:  90  Preferred Pharmacy with phone number:    [id number] - [place] - [address] AT [facility] [address]  [address] [address]  Phone: [phone] Fax: [phone]  Local or Mail Order:  local  Ordering Provider:    Best Call Back Number:  [phone]  Additional Information:  patient is out of medication and out of refills.

## 2020-11-12 NOTE — TELEPHONE ENCOUNTER
Who Called:  Enrico  Refill or New Rx:  refill  RX Name and Strength:    rosuvastatin (CRESTOR) 10 MG tablet         90 tablet          3          10/22/2019      10/21/2020              Sig - Route: Take 1 tablet (10 mg total) by mouth every evening. - Oral        How is the patient currently taking it? (ex. 1XDay):  see above  Is this a 30 day or 90 day RX:  90  Preferred Pharmacy with phone number:    [id number] - [place] - [address] AT [facility] [address]  [address] [address]  Phone: [phone] Fax: [phone]  Local or Mail Order:  local  Ordering Provider:    Best Call Back Number:  [phone]  Additional Information:  patient is out of medication and out of refills.

## 2020-11-16 RX ORDER — ROSUVASTATIN CALCIUM 10 MG/1
10 TABLET, COATED ORAL NIGHTLY
Qty: 90 TABLET | Refills: 3 | Status: SHIPPED | OUTPATIENT
Start: 2020-11-16 | End: 2021-02-08 | Stop reason: SDUPTHER

## 2021-02-08 ENCOUNTER — OFFICE VISIT (OUTPATIENT)
Dept: CARDIOLOGY | Facility: CLINIC | Age: 56
End: 2021-02-08
Payer: COMMERCIAL

## 2021-02-08 VITALS
SYSTOLIC BLOOD PRESSURE: 134 MMHG | RESPIRATION RATE: 16 BRPM | BODY MASS INDEX: 36.54 KG/M2 | OXYGEN SATURATION: 96 % | DIASTOLIC BLOOD PRESSURE: 80 MMHG | WEIGHT: 261 LBS | HEART RATE: 63 BPM | HEIGHT: 71 IN

## 2021-02-08 DIAGNOSIS — Z95.5 S/P CORONARY ARTERY STENT PLACEMENT: ICD-10-CM

## 2021-02-08 DIAGNOSIS — E78.00 PURE HYPERCHOLESTEROLEMIA: ICD-10-CM

## 2021-02-08 DIAGNOSIS — R79.89 LOW TESTOSTERONE: ICD-10-CM

## 2021-02-08 PROCEDURE — 99214 PR OFFICE/OUTPT VISIT, EST, LEVL IV, 30-39 MIN: ICD-10-PCS | Mod: S$GLB,,, | Performed by: INTERNAL MEDICINE

## 2021-02-08 PROCEDURE — 3075F SYST BP GE 130 - 139MM HG: CPT | Mod: CPTII,S$GLB,, | Performed by: INTERNAL MEDICINE

## 2021-02-08 PROCEDURE — 1126F PR PAIN SEVERITY QUANTIFIED, NO PAIN PRESENT: ICD-10-PCS | Mod: S$GLB,,, | Performed by: INTERNAL MEDICINE

## 2021-02-08 PROCEDURE — 99214 OFFICE O/P EST MOD 30 MIN: CPT | Mod: S$GLB,,, | Performed by: INTERNAL MEDICINE

## 2021-02-08 PROCEDURE — 3075F PR MOST RECENT SYSTOLIC BLOOD PRESS GE 130-139MM HG: ICD-10-PCS | Mod: CPTII,S$GLB,, | Performed by: INTERNAL MEDICINE

## 2021-02-08 PROCEDURE — 3079F PR MOST RECENT DIASTOLIC BLOOD PRESSURE 80-89 MM HG: ICD-10-PCS | Mod: CPTII,S$GLB,, | Performed by: INTERNAL MEDICINE

## 2021-02-08 PROCEDURE — 1126F AMNT PAIN NOTED NONE PRSNT: CPT | Mod: S$GLB,,, | Performed by: INTERNAL MEDICINE

## 2021-02-08 PROCEDURE — 3008F PR BODY MASS INDEX (BMI) DOCUMENTED: ICD-10-PCS | Mod: CPTII,S$GLB,, | Performed by: INTERNAL MEDICINE

## 2021-02-08 PROCEDURE — 3079F DIAST BP 80-89 MM HG: CPT | Mod: CPTII,S$GLB,, | Performed by: INTERNAL MEDICINE

## 2021-02-08 PROCEDURE — 3008F BODY MASS INDEX DOCD: CPT | Mod: CPTII,S$GLB,, | Performed by: INTERNAL MEDICINE

## 2021-02-08 RX ORDER — METOPROLOL SUCCINATE 25 MG/1
25 TABLET, EXTENDED RELEASE ORAL DAILY
Qty: 90 TABLET | Refills: 3 | Status: SHIPPED | OUTPATIENT
Start: 2021-02-08 | End: 2021-06-02 | Stop reason: SDUPTHER

## 2021-02-08 RX ORDER — ROSUVASTATIN CALCIUM 10 MG/1
10 TABLET, COATED ORAL NIGHTLY
Qty: 90 TABLET | Refills: 3 | Status: SHIPPED | OUTPATIENT
Start: 2021-02-08 | End: 2021-05-11

## 2021-02-08 RX ORDER — LISINOPRIL 2.5 MG/1
2.5 TABLET ORAL DAILY
Qty: 90 TABLET | Refills: 3 | Status: SHIPPED | OUTPATIENT
Start: 2021-02-08 | End: 2021-06-02 | Stop reason: SDUPTHER

## 2021-03-01 ENCOUNTER — OFFICE VISIT (OUTPATIENT)
Dept: FAMILY MEDICINE | Facility: CLINIC | Age: 56
End: 2021-03-01
Payer: COMMERCIAL

## 2021-03-01 ENCOUNTER — LAB VISIT (OUTPATIENT)
Dept: LAB | Facility: HOSPITAL | Age: 56
End: 2021-03-01
Attending: FAMILY MEDICINE
Payer: COMMERCIAL

## 2021-03-01 VITALS
HEIGHT: 71 IN | DIASTOLIC BLOOD PRESSURE: 76 MMHG | OXYGEN SATURATION: 98 % | BODY MASS INDEX: 36.39 KG/M2 | TEMPERATURE: 98 F | HEART RATE: 71 BPM | SYSTOLIC BLOOD PRESSURE: 128 MMHG | WEIGHT: 259.94 LBS

## 2021-03-01 DIAGNOSIS — Z79.890 ENCOUNTER FOR MONITORING TESTOSTERONE REPLACEMENT THERAPY: ICD-10-CM

## 2021-03-01 DIAGNOSIS — I25.10 CORONARY ARTERY DISEASE WITHOUT ANGINA PECTORIS, UNSPECIFIED VESSEL OR LESION TYPE, UNSPECIFIED WHETHER NATIVE OR TRANSPLANTED HEART: ICD-10-CM

## 2021-03-01 DIAGNOSIS — Z51.81 ENCOUNTER FOR MONITORING TESTOSTERONE REPLACEMENT THERAPY: Primary | ICD-10-CM

## 2021-03-01 DIAGNOSIS — D56.3 THALASSEMIA MINOR: ICD-10-CM

## 2021-03-01 DIAGNOSIS — E55.9 VITAMIN D DEFICIENCY: ICD-10-CM

## 2021-03-01 DIAGNOSIS — Z12.5 PROSTATE CANCER SCREENING: ICD-10-CM

## 2021-03-01 DIAGNOSIS — Z11.4 SCREENING FOR HIV (HUMAN IMMUNODEFICIENCY VIRUS): ICD-10-CM

## 2021-03-01 DIAGNOSIS — Z51.81 ENCOUNTER FOR MONITORING TESTOSTERONE REPLACEMENT THERAPY: ICD-10-CM

## 2021-03-01 DIAGNOSIS — Z79.890 ENCOUNTER FOR MONITORING TESTOSTERONE REPLACEMENT THERAPY: Primary | ICD-10-CM

## 2021-03-01 LAB
BASOPHILS # BLD AUTO: 0.04 K/UL (ref 0–0.2)
BASOPHILS NFR BLD: 0.7 % (ref 0–1.9)
DIFFERENTIAL METHOD: ABNORMAL
EOSINOPHIL # BLD AUTO: 0.1 K/UL (ref 0–0.5)
EOSINOPHIL NFR BLD: 2 % (ref 0–8)
ERYTHROCYTE [DISTWIDTH] IN BLOOD BY AUTOMATED COUNT: 17.8 % (ref 11.5–14.5)
HCT VFR BLD AUTO: 45.8 % (ref 40–54)
HGB BLD-MCNC: 14.2 G/DL (ref 14–18)
IMM GRANULOCYTES # BLD AUTO: 0.01 K/UL (ref 0–0.04)
IMM GRANULOCYTES NFR BLD AUTO: 0.2 % (ref 0–0.5)
LYMPHOCYTES # BLD AUTO: 1.2 K/UL (ref 1–4.8)
LYMPHOCYTES NFR BLD: 22 % (ref 18–48)
MCH RBC QN AUTO: 21.4 PG (ref 27–31)
MCHC RBC AUTO-ENTMCNC: 31 G/DL (ref 32–36)
MCV RBC AUTO: 69 FL (ref 82–98)
MONOCYTES # BLD AUTO: 0.4 K/UL (ref 0.3–1)
MONOCYTES NFR BLD: 7.5 % (ref 4–15)
NEUTROPHILS # BLD AUTO: 3.8 K/UL (ref 1.8–7.7)
NEUTROPHILS NFR BLD: 67.6 % (ref 38–73)
NRBC BLD-RTO: 0 /100 WBC
PLATELET # BLD AUTO: 179 K/UL (ref 150–350)
PMV BLD AUTO: 9.8 FL (ref 9.2–12.9)
RBC # BLD AUTO: 6.65 M/UL (ref 4.6–6.2)
WBC # BLD AUTO: 5.6 K/UL (ref 3.9–12.7)

## 2021-03-01 PROCEDURE — 99213 PR OFFICE/OUTPT VISIT, EST, LEVL III, 20-29 MIN: ICD-10-PCS | Mod: S$GLB,,, | Performed by: FAMILY MEDICINE

## 2021-03-01 PROCEDURE — 3008F PR BODY MASS INDEX (BMI) DOCUMENTED: ICD-10-PCS | Mod: CPTII,S$GLB,, | Performed by: FAMILY MEDICINE

## 2021-03-01 PROCEDURE — 99999 PR PBB SHADOW E&M-EST. PATIENT-LVL III: CPT | Mod: PBBFAC,,, | Performed by: FAMILY MEDICINE

## 2021-03-01 PROCEDURE — 3078F DIAST BP <80 MM HG: CPT | Mod: CPTII,S$GLB,, | Performed by: FAMILY MEDICINE

## 2021-03-01 PROCEDURE — 84153 ASSAY OF PSA TOTAL: CPT

## 2021-03-01 PROCEDURE — 80061 LIPID PANEL: CPT

## 2021-03-01 PROCEDURE — 3078F PR MOST RECENT DIASTOLIC BLOOD PRESSURE < 80 MM HG: ICD-10-PCS | Mod: CPTII,S$GLB,, | Performed by: FAMILY MEDICINE

## 2021-03-01 PROCEDURE — 84402 ASSAY OF FREE TESTOSTERONE: CPT

## 2021-03-01 PROCEDURE — 1126F AMNT PAIN NOTED NONE PRSNT: CPT | Mod: S$GLB,,, | Performed by: FAMILY MEDICINE

## 2021-03-01 PROCEDURE — 82306 VITAMIN D 25 HYDROXY: CPT

## 2021-03-01 PROCEDURE — 1126F PR PAIN SEVERITY QUANTIFIED, NO PAIN PRESENT: ICD-10-PCS | Mod: S$GLB,,, | Performed by: FAMILY MEDICINE

## 2021-03-01 PROCEDURE — 3008F BODY MASS INDEX DOCD: CPT | Mod: CPTII,S$GLB,, | Performed by: FAMILY MEDICINE

## 2021-03-01 PROCEDURE — 3074F SYST BP LT 130 MM HG: CPT | Mod: CPTII,S$GLB,, | Performed by: FAMILY MEDICINE

## 2021-03-01 PROCEDURE — 3074F PR MOST RECENT SYSTOLIC BLOOD PRESSURE < 130 MM HG: ICD-10-PCS | Mod: CPTII,S$GLB,, | Performed by: FAMILY MEDICINE

## 2021-03-01 PROCEDURE — 99999 PR PBB SHADOW E&M-EST. PATIENT-LVL III: ICD-10-PCS | Mod: PBBFAC,,, | Performed by: FAMILY MEDICINE

## 2021-03-01 PROCEDURE — 86703 HIV-1/HIV-2 1 RESULT ANTBDY: CPT

## 2021-03-01 PROCEDURE — 36415 COLL VENOUS BLD VENIPUNCTURE: CPT | Mod: PO

## 2021-03-01 PROCEDURE — 99213 OFFICE O/P EST LOW 20 MIN: CPT | Mod: S$GLB,,, | Performed by: FAMILY MEDICINE

## 2021-03-01 PROCEDURE — 85025 COMPLETE CBC W/AUTO DIFF WBC: CPT

## 2021-03-01 PROCEDURE — 80053 COMPREHEN METABOLIC PANEL: CPT

## 2021-03-01 RX ORDER — SILDENAFIL 100 MG/1
100 TABLET, FILM COATED ORAL
COMMUNITY
Start: 2021-02-01 | End: 2022-04-19

## 2021-03-02 LAB
25(OH)D3+25(OH)D2 SERPL-MCNC: 44 NG/ML (ref 30–96)
ALBUMIN SERPL BCP-MCNC: 4.2 G/DL (ref 3.5–5.2)
ALP SERPL-CCNC: 79 U/L (ref 55–135)
ALT SERPL W/O P-5'-P-CCNC: 45 U/L (ref 10–44)
ANION GAP SERPL CALC-SCNC: 11 MMOL/L (ref 8–16)
AST SERPL-CCNC: 49 U/L (ref 10–40)
BILIRUB SERPL-MCNC: 1 MG/DL (ref 0.1–1)
BUN SERPL-MCNC: 20 MG/DL (ref 6–20)
CALCIUM SERPL-MCNC: 9.5 MG/DL (ref 8.7–10.5)
CHLORIDE SERPL-SCNC: 101 MMOL/L (ref 95–110)
CHOLEST SERPL-MCNC: 93 MG/DL (ref 120–199)
CHOLEST/HDLC SERPL: 3.3 {RATIO} (ref 2–5)
CO2 SERPL-SCNC: 27 MMOL/L (ref 23–29)
COMPLEXED PSA SERPL-MCNC: 1.3 NG/ML (ref 0–4)
CREAT SERPL-MCNC: 1.5 MG/DL (ref 0.5–1.4)
EST. GFR  (AFRICAN AMERICAN): 59.3 ML/MIN/1.73 M^2
EST. GFR  (NON AFRICAN AMERICAN): 51.3 ML/MIN/1.73 M^2
GLUCOSE SERPL-MCNC: 96 MG/DL (ref 70–110)
HDLC SERPL-MCNC: 28 MG/DL (ref 40–75)
HDLC SERPL: 30.1 % (ref 20–50)
LDLC SERPL CALC-MCNC: 18.2 MG/DL (ref 63–159)
NONHDLC SERPL-MCNC: 65 MG/DL
POTASSIUM SERPL-SCNC: 4.1 MMOL/L (ref 3.5–5.1)
PROT SERPL-MCNC: 7.3 G/DL (ref 6–8.4)
SODIUM SERPL-SCNC: 139 MMOL/L (ref 136–145)
TRIGL SERPL-MCNC: 234 MG/DL (ref 30–150)

## 2021-03-03 LAB — HIV 1+2 AB+HIV1 P24 AG SERPL QL IA: NEGATIVE

## 2021-03-04 ENCOUNTER — TELEPHONE (OUTPATIENT)
Dept: FAMILY MEDICINE | Facility: CLINIC | Age: 56
End: 2021-03-04

## 2021-03-04 LAB — TESTOST FREE SERPL-MCNC: 4.8 PG/ML

## 2021-05-11 RX ORDER — ROSUVASTATIN CALCIUM 10 MG/1
TABLET, COATED ORAL
Qty: 90 TABLET | Refills: 3 | Status: SHIPPED | OUTPATIENT
Start: 2021-05-11 | End: 2021-09-03

## 2021-06-02 RX ORDER — LISINOPRIL 2.5 MG/1
2.5 TABLET ORAL 2 TIMES DAILY
Qty: 180 TABLET | Refills: 3 | Status: SHIPPED | OUTPATIENT
Start: 2021-06-02 | End: 2021-10-18 | Stop reason: SDUPTHER

## 2021-06-02 RX ORDER — METOPROLOL SUCCINATE 25 MG/1
25 TABLET, EXTENDED RELEASE ORAL DAILY
Qty: 90 TABLET | Refills: 3 | Status: SHIPPED | OUTPATIENT
Start: 2021-06-02 | End: 2021-10-18 | Stop reason: SDUPTHER

## 2021-06-03 ENCOUNTER — PATIENT MESSAGE (OUTPATIENT)
Dept: CARDIOLOGY | Facility: CLINIC | Age: 56
End: 2021-06-03

## 2021-06-04 RX ORDER — METOPROLOL SUCCINATE 25 MG/1
25 TABLET, EXTENDED RELEASE ORAL DAILY
Qty: 90 TABLET | Refills: 3 | Status: CANCELLED | OUTPATIENT
Start: 2021-06-04 | End: 2022-06-04

## 2021-06-04 RX ORDER — LISINOPRIL 2.5 MG/1
2.5 TABLET ORAL 2 TIMES DAILY
Qty: 180 TABLET | Refills: 3 | Status: CANCELLED | OUTPATIENT
Start: 2021-06-04 | End: 2022-06-04

## 2021-06-08 ENCOUNTER — TELEPHONE (OUTPATIENT)
Dept: CARDIOLOGY | Facility: CLINIC | Age: 56
End: 2021-06-08

## 2021-07-07 ENCOUNTER — TELEPHONE (OUTPATIENT)
Dept: CARDIOLOGY | Facility: CLINIC | Age: 56
End: 2021-07-07

## 2021-07-23 ENCOUNTER — TELEPHONE (OUTPATIENT)
Dept: FAMILY MEDICINE | Facility: CLINIC | Age: 56
End: 2021-07-23

## 2021-08-10 ENCOUNTER — TELEPHONE (OUTPATIENT)
Dept: CARDIOLOGY | Facility: CLINIC | Age: 56
End: 2021-08-10
Payer: COMMERCIAL

## 2021-08-10 NOTE — TELEPHONE ENCOUNTER
----- Message from Yovany May sent at 8/10/2021 11:42 AM CDT -----  Contact: pt at [phone]  Type:  Sooner Apoointment Request  Caller is requesting a sooner appointment.  Caller declined first available appointment listed below.  Caller will not accept being placed on the waitlist and is requesting a message be sent to doctor.  Name of Caller:  pt  When is the first available appointment?  12/9/21  Symptoms:  Reschedule Appointment  Best Call Back Number:  [phone]  Additional Information:  pt is calling the office to reschedule his 8/9/21 appointment he missed and wants to be seen sooner than 12/9/21 due to needing medication renewals. Please c all back and leyla

## 2021-08-10 NOTE — TELEPHONE ENCOUNTER
----- Message from Haydee Gómez sent at 8/10/2021  3:53 PM CDT -----  Pt calling to speak with someone regarding a sooner appointment than 12/1. Please call the pt regarding his concerns.

## 2021-08-10 NOTE — TELEPHONE ENCOUNTER
----- Message from Haydee Gómez sent at 8/10/2021  3:51 PM CDT -----  Contact: [phone] (P)  Sooner Apoointment Request  Caller is requesting a sooner appointment.  Caller declined first available appointment listed below.  Caller will not accept being placed on the waitlist and is requesting a message be sent to doctor.  Name of Caller: Enrico  When is the first available appointment?12/1  Symptoms: 6mth f./u  Would the patient rather a call back or a response via [facility]? Call back  Best Call Back Number:[phone] (M)  Additional Information: Pt is requesting a sooner appointment than 12/1

## 2021-08-11 NOTE — TELEPHONE ENCOUNTER
----- Message from Teeteeko Daley sent at 8/11/2021  2:19 PM CDT -----  Pt calling again wants to know status of this       Conversation  (Newest Message First)           8/10/21 5:35 PM  Arely Louis MA routed this conversation to [facility] Staff   Arely Louis MA         8/10/21 5:35 PM  Note  ----- Message from Haydee Gómez sent at 8/10/2021  3:53 PM CDT -----  Pt calling to speak with someone regarding a sooner appointment than 12/1. Please call the pt regarding his concerns.                   8/10/21 5:35 PM  Arely Louis MA [facility]   Arely Louis MA         8/10/21 5:35 PM  Note  ----- Message from Yovany Erickson sent at 8/10/2021 11:42 AM CDT -----  Contact: pt at [phone]  Type:  Sooner Apoointment Request  Caller is requesting a sooner appointment.  Caller declined first available appointment listed below.  Caller will not accept being placed on the waitlist and is requesting a message be sent to doctor.  Name of Caller:  pt  When is the first available appointment?  12/9/21  Symptoms:  Reschedule Appointment  Best Call Back Number:  [phone]  Additional Information:  pt is calling the office to reschedule his 8/9/21 appointment he missed and wants to be seen sooner than 12/9/21 due to needing medication renewals. Please c all back and leyla

## 2021-10-18 ENCOUNTER — OFFICE VISIT (OUTPATIENT)
Dept: CARDIOLOGY | Facility: CLINIC | Age: 56
End: 2021-10-18
Payer: COMMERCIAL

## 2021-10-18 VITALS
SYSTOLIC BLOOD PRESSURE: 138 MMHG | HEART RATE: 80 BPM | BODY MASS INDEX: 36.26 KG/M2 | DIASTOLIC BLOOD PRESSURE: 88 MMHG | WEIGHT: 259 LBS | HEIGHT: 71 IN

## 2021-10-18 DIAGNOSIS — E78.00 PURE HYPERCHOLESTEROLEMIA: ICD-10-CM

## 2021-10-18 DIAGNOSIS — I25.10 CORONARY ARTERY DISEASE INVOLVING NATIVE CORONARY ARTERY OF NATIVE HEART WITHOUT ANGINA PECTORIS: ICD-10-CM

## 2021-10-18 DIAGNOSIS — Z95.5 S/P CORONARY ARTERY STENT PLACEMENT: ICD-10-CM

## 2021-10-18 DIAGNOSIS — E65 ABDOMINAL OBESITY: ICD-10-CM

## 2021-10-18 PROCEDURE — 1159F MED LIST DOCD IN RCRD: CPT | Mod: CPTII,S$GLB,, | Performed by: INTERNAL MEDICINE

## 2021-10-18 PROCEDURE — 99214 PR OFFICE/OUTPT VISIT, EST, LEVL IV, 30-39 MIN: ICD-10-PCS | Mod: S$GLB,,, | Performed by: INTERNAL MEDICINE

## 2021-10-18 PROCEDURE — 99214 OFFICE O/P EST MOD 30 MIN: CPT | Mod: S$GLB,,, | Performed by: INTERNAL MEDICINE

## 2021-10-18 PROCEDURE — 4010F ACE/ARB THERAPY RXD/TAKEN: CPT | Mod: CPTII,S$GLB,, | Performed by: INTERNAL MEDICINE

## 2021-10-18 PROCEDURE — 4010F PR ACE/ARB THEARPY RXD/TAKEN: ICD-10-PCS | Mod: CPTII,S$GLB,, | Performed by: INTERNAL MEDICINE

## 2021-10-18 PROCEDURE — 3008F BODY MASS INDEX DOCD: CPT | Mod: CPTII,S$GLB,, | Performed by: INTERNAL MEDICINE

## 2021-10-18 PROCEDURE — 3008F PR BODY MASS INDEX (BMI) DOCUMENTED: ICD-10-PCS | Mod: CPTII,S$GLB,, | Performed by: INTERNAL MEDICINE

## 2021-10-18 PROCEDURE — 1160F PR REVIEW ALL MEDS BY PRESCRIBER/CLIN PHARMACIST DOCUMENTED: ICD-10-PCS | Mod: CPTII,S$GLB,, | Performed by: INTERNAL MEDICINE

## 2021-10-18 PROCEDURE — 3079F PR MOST RECENT DIASTOLIC BLOOD PRESSURE 80-89 MM HG: ICD-10-PCS | Mod: CPTII,S$GLB,, | Performed by: INTERNAL MEDICINE

## 2021-10-18 PROCEDURE — 3075F PR MOST RECENT SYSTOLIC BLOOD PRESS GE 130-139MM HG: ICD-10-PCS | Mod: CPTII,S$GLB,, | Performed by: INTERNAL MEDICINE

## 2021-10-18 PROCEDURE — 1159F PR MEDICATION LIST DOCUMENTED IN MEDICAL RECORD: ICD-10-PCS | Mod: CPTII,S$GLB,, | Performed by: INTERNAL MEDICINE

## 2021-10-18 PROCEDURE — 3079F DIAST BP 80-89 MM HG: CPT | Mod: CPTII,S$GLB,, | Performed by: INTERNAL MEDICINE

## 2021-10-18 PROCEDURE — 3075F SYST BP GE 130 - 139MM HG: CPT | Mod: CPTII,S$GLB,, | Performed by: INTERNAL MEDICINE

## 2021-10-18 PROCEDURE — 1160F RVW MEDS BY RX/DR IN RCRD: CPT | Mod: CPTII,S$GLB,, | Performed by: INTERNAL MEDICINE

## 2021-10-18 RX ORDER — LISINOPRIL 2.5 MG/1
2.5 TABLET ORAL 2 TIMES DAILY
Qty: 180 TABLET | Refills: 3 | Status: SHIPPED | OUTPATIENT
Start: 2021-10-18 | End: 2022-04-19 | Stop reason: SDUPTHER

## 2021-10-18 RX ORDER — METOPROLOL SUCCINATE 25 MG/1
25 TABLET, EXTENDED RELEASE ORAL DAILY
Qty: 90 TABLET | Refills: 3 | Status: SHIPPED | OUTPATIENT
Start: 2021-10-18 | End: 2022-04-19 | Stop reason: SDUPTHER

## 2021-10-18 RX ORDER — ROSUVASTATIN CALCIUM 10 MG/1
10 TABLET, COATED ORAL NIGHTLY
Qty: 90 TABLET | Refills: 3 | Status: SHIPPED | OUTPATIENT
Start: 2021-10-18 | End: 2022-04-19 | Stop reason: SDUPTHER

## 2022-03-21 ENCOUNTER — TELEPHONE (OUTPATIENT)
Dept: CARDIOLOGY | Facility: CLINIC | Age: 57
End: 2022-03-21
Payer: COMMERCIAL

## 2022-03-21 NOTE — TELEPHONE ENCOUNTER
----- Message from Cr Tarango sent at 3/21/2022  9:58 AM CDT -----  Contact: pt  Asking to get bloodwork done before appt and needing to verify location to go from nurse please call back pt asap     [phone]

## 2022-04-11 ENCOUNTER — LAB VISIT (OUTPATIENT)
Dept: LAB | Facility: HOSPITAL | Age: 57
End: 2022-04-11
Attending: INTERNAL MEDICINE
Payer: COMMERCIAL

## 2022-04-11 DIAGNOSIS — E65 ABDOMINAL OBESITY: ICD-10-CM

## 2022-04-11 DIAGNOSIS — I25.10 CORONARY ARTERY DISEASE INVOLVING NATIVE CORONARY ARTERY OF NATIVE HEART WITHOUT ANGINA PECTORIS: ICD-10-CM

## 2022-04-11 DIAGNOSIS — Z95.5 S/P CORONARY ARTERY STENT PLACEMENT: ICD-10-CM

## 2022-04-11 LAB
ALBUMIN SERPL BCP-MCNC: 4.6 G/DL (ref 3.5–5.2)
ALP SERPL-CCNC: 74 U/L (ref 55–135)
ALT SERPL W/O P-5'-P-CCNC: 47 U/L (ref 10–44)
ANION GAP SERPL CALC-SCNC: 9 MMOL/L (ref 8–16)
AST SERPL-CCNC: 44 U/L (ref 10–40)
BILIRUB SERPL-MCNC: 1.1 MG/DL (ref 0.1–1)
BUN SERPL-MCNC: 19 MG/DL (ref 6–20)
CALCIUM SERPL-MCNC: 9.7 MG/DL (ref 8.7–10.5)
CHLORIDE SERPL-SCNC: 102 MMOL/L (ref 95–110)
CHOLEST SERPL-MCNC: 99 MG/DL (ref 120–199)
CHOLEST/HDLC SERPL: 3.1 {RATIO} (ref 2–5)
CO2 SERPL-SCNC: 28 MMOL/L (ref 23–29)
CREAT SERPL-MCNC: 1.2 MG/DL (ref 0.5–1.4)
ERYTHROCYTE [DISTWIDTH] IN BLOOD BY AUTOMATED COUNT: 17 % (ref 11.5–14.5)
EST. GFR  (AFRICAN AMERICAN): >60 ML/MIN/1.73 M^2
EST. GFR  (NON AFRICAN AMERICAN): >60 ML/MIN/1.73 M^2
GLUCOSE SERPL-MCNC: 120 MG/DL (ref 70–110)
HCT VFR BLD AUTO: 43.7 % (ref 40–54)
HDLC SERPL-MCNC: 32 MG/DL (ref 40–75)
HDLC SERPL: 32.3 % (ref 20–50)
HGB BLD-MCNC: 13.7 G/DL (ref 14–18)
LDLC SERPL CALC-MCNC: 41.6 MG/DL (ref 63–159)
MCH RBC QN AUTO: 21.1 PG (ref 27–31)
MCHC RBC AUTO-ENTMCNC: 31.4 G/DL (ref 32–36)
MCV RBC AUTO: 67 FL (ref 82–98)
NONHDLC SERPL-MCNC: 67 MG/DL
PLATELET # BLD AUTO: 171 K/UL (ref 150–450)
PMV BLD AUTO: 9 FL (ref 9.2–12.9)
POTASSIUM SERPL-SCNC: 4.7 MMOL/L (ref 3.5–5.1)
PROT SERPL-MCNC: 7.1 G/DL (ref 6–8.4)
RBC # BLD AUTO: 6.49 M/UL (ref 4.6–6.2)
SODIUM SERPL-SCNC: 139 MMOL/L (ref 136–145)
TRIGL SERPL-MCNC: 127 MG/DL (ref 30–150)
WBC # BLD AUTO: 5.13 K/UL (ref 3.9–12.7)

## 2022-04-11 PROCEDURE — 80053 COMPREHEN METABOLIC PANEL: CPT | Performed by: INTERNAL MEDICINE

## 2022-04-11 PROCEDURE — 80061 LIPID PANEL: CPT | Performed by: INTERNAL MEDICINE

## 2022-04-11 PROCEDURE — 36415 COLL VENOUS BLD VENIPUNCTURE: CPT | Performed by: INTERNAL MEDICINE

## 2022-04-11 PROCEDURE — 85027 COMPLETE CBC AUTOMATED: CPT | Performed by: INTERNAL MEDICINE

## 2022-04-19 ENCOUNTER — OFFICE VISIT (OUTPATIENT)
Dept: CARDIOLOGY | Facility: CLINIC | Age: 57
End: 2022-04-19
Payer: COMMERCIAL

## 2022-04-19 VITALS
RESPIRATION RATE: 16 BRPM | DIASTOLIC BLOOD PRESSURE: 80 MMHG | OXYGEN SATURATION: 99 % | HEART RATE: 61 BPM | BODY MASS INDEX: 36.26 KG/M2 | HEIGHT: 71 IN | SYSTOLIC BLOOD PRESSURE: 136 MMHG | WEIGHT: 259 LBS

## 2022-04-19 DIAGNOSIS — Z95.5 S/P CORONARY ARTERY STENT PLACEMENT: ICD-10-CM

## 2022-04-19 DIAGNOSIS — Z82.49 FAMILY HISTORY OF PREMATURE CAD: ICD-10-CM

## 2022-04-19 DIAGNOSIS — E78.00 PURE HYPERCHOLESTEROLEMIA: ICD-10-CM

## 2022-04-19 DIAGNOSIS — I21.4 NSTEMI (NON-ST ELEVATED MYOCARDIAL INFARCTION): ICD-10-CM

## 2022-04-19 DIAGNOSIS — D56.3 THALASSEMIA MINOR: ICD-10-CM

## 2022-04-19 PROCEDURE — 4010F PR ACE/ARB THEARPY RXD/TAKEN: ICD-10-PCS | Mod: CPTII,S$GLB,, | Performed by: INTERNAL MEDICINE

## 2022-04-19 PROCEDURE — 3079F DIAST BP 80-89 MM HG: CPT | Mod: CPTII,S$GLB,, | Performed by: INTERNAL MEDICINE

## 2022-04-19 PROCEDURE — 3075F SYST BP GE 130 - 139MM HG: CPT | Mod: CPTII,S$GLB,, | Performed by: INTERNAL MEDICINE

## 2022-04-19 PROCEDURE — 3075F PR MOST RECENT SYSTOLIC BLOOD PRESS GE 130-139MM HG: ICD-10-PCS | Mod: CPTII,S$GLB,, | Performed by: INTERNAL MEDICINE

## 2022-04-19 PROCEDURE — 99214 OFFICE O/P EST MOD 30 MIN: CPT | Mod: S$GLB,,, | Performed by: INTERNAL MEDICINE

## 2022-04-19 PROCEDURE — 1159F PR MEDICATION LIST DOCUMENTED IN MEDICAL RECORD: ICD-10-PCS | Mod: CPTII,S$GLB,, | Performed by: INTERNAL MEDICINE

## 2022-04-19 PROCEDURE — 3008F PR BODY MASS INDEX (BMI) DOCUMENTED: ICD-10-PCS | Mod: CPTII,S$GLB,, | Performed by: INTERNAL MEDICINE

## 2022-04-19 PROCEDURE — 1159F MED LIST DOCD IN RCRD: CPT | Mod: CPTII,S$GLB,, | Performed by: INTERNAL MEDICINE

## 2022-04-19 PROCEDURE — 3079F PR MOST RECENT DIASTOLIC BLOOD PRESSURE 80-89 MM HG: ICD-10-PCS | Mod: CPTII,S$GLB,, | Performed by: INTERNAL MEDICINE

## 2022-04-19 PROCEDURE — 1160F RVW MEDS BY RX/DR IN RCRD: CPT | Mod: CPTII,S$GLB,, | Performed by: INTERNAL MEDICINE

## 2022-04-19 PROCEDURE — 99214 PR OFFICE/OUTPT VISIT, EST, LEVL IV, 30-39 MIN: ICD-10-PCS | Mod: S$GLB,,, | Performed by: INTERNAL MEDICINE

## 2022-04-19 PROCEDURE — 4010F ACE/ARB THERAPY RXD/TAKEN: CPT | Mod: CPTII,S$GLB,, | Performed by: INTERNAL MEDICINE

## 2022-04-19 PROCEDURE — 3008F BODY MASS INDEX DOCD: CPT | Mod: CPTII,S$GLB,, | Performed by: INTERNAL MEDICINE

## 2022-04-19 PROCEDURE — 1160F PR REVIEW ALL MEDS BY PRESCRIBER/CLIN PHARMACIST DOCUMENTED: ICD-10-PCS | Mod: CPTII,S$GLB,, | Performed by: INTERNAL MEDICINE

## 2022-04-19 RX ORDER — METOPROLOL SUCCINATE 25 MG/1
25 TABLET, EXTENDED RELEASE ORAL DAILY
Qty: 90 TABLET | Refills: 3 | Status: SHIPPED | OUTPATIENT
Start: 2022-04-19 | End: 2022-05-25

## 2022-04-19 RX ORDER — LISINOPRIL 2.5 MG/1
2.5 TABLET ORAL 2 TIMES DAILY
Qty: 180 TABLET | Refills: 3 | Status: SHIPPED | OUTPATIENT
Start: 2022-04-19 | End: 2022-05-25

## 2022-04-19 RX ORDER — ROSUVASTATIN CALCIUM 10 MG/1
10 TABLET, COATED ORAL NIGHTLY
Qty: 90 TABLET | Refills: 3 | Status: SHIPPED | OUTPATIENT
Start: 2022-04-19 | End: 2022-12-01 | Stop reason: SDUPTHER

## 2022-04-19 NOTE — ASSESSMENT & PLAN NOTE
He has prior history of non-STEMI maintain on Toprol-XL are XL at g daily and low-dose of lisinopril 2.5 mg twice daily as well as are dual antiplatelet therapy.

## 2022-04-19 NOTE — ASSESSMENT & PLAN NOTE
Is doing well with no recurrence of any angina.  Continue on risk factor modification for secondary prevention he is presently on Brilinta 90 mg p.o. b.i.d., enteric-coated aspirin 81 and continue on Crestor 10 mg daily his LDL cholesterol is below 50. He is already on a strict diet and he now plans to lose some weight as well

## 2022-04-19 NOTE — PROGRESS NOTES
Subjective:    Patient ID:  Enrico Talbot is a 57 y.o. male patient here for evaluation Coronary Artery Disease and Hyperlipidemia      History of Present Illness:   Patient is here for follow-up evaluation seem to be doing reasonably well.  He is 25 miles a week walking exercise.  And is tolerating this very well and he is also using some support stockings for lower extremity occasional swelling.  Are overall he is doing remarkably well.  No cough congestion no fevers no chills no blood in the stools or black stools          Review of patient's allergies indicates:   Allergen Reactions    Lactose intolerance [lactase]        Past Medical History:   Diagnosis Date    Anemia     AR (allergic rhinitis)      Past Surgical History:   Procedure Laterality Date    CORONARY ANGIOGRAPHY Right 10/21/2019    Procedure: Left heart cath and possible PCI;  Surgeon: Scooby Mejia MD;  Location: [facility] CATH/EP LAB;  Service: Cardiology;  Laterality: Right;    LEFT HEART CATHETERIZATION Left 10/21/2019    Procedure: Left heart cath;  Surgeon: Scooby Mejia MD;  Location: [facility] CATH/EP LAB;  Service: Cardiology;  Laterality: Left;    REFRACTIVE SURGERY  1998    SINUS SURGERY  1995     Social History     Tobacco Use    Smoking status: Never Smoker    Smokeless tobacco: Never Used   Substance Use Topics    Alcohol use: No     Comment: QUIT 1985    Drug use: No        Review of Systems:    As noted in HPI in addition      REVIEW OF SYSTEMS  CARDIOVASCULAR: No recent chest pain, palpitations, arm, neck, or jaw pain  RESPIRATORY: No recent fever, cough chills, SOB or congestion  : No blood in the urine  GI: No Nausea, vomiting, constipation, diarrhea, blood, or reflux.  MUSCULOSKELETAL: No myalgias  NEURO: No lightheadedness or dizziness  EYES: No Double vision, blurry, vision or headache              Objective        Vitals:    04/19/22 1356   BP: 136/80   Pulse: 61   Resp: 16       LIPIDS - LAST 2   Lab  Results   Component Value Date    CHOL 99 (L) 04/11/2022    CHOL 93 (L) 03/01/2021    HDL 32 (L) 04/11/2022    HDL 28 (L) 03/01/2021    LDLCALC 41.6 (L) 04/11/2022    LDLCALC 18.2 (L) 03/01/2021    TRIG 127 04/11/2022    TRIG 234 (H) 03/01/2021    CHOLHDL 32.3 04/11/2022    CHOLHDL 30.1 03/01/2021       CBC - LAST 2  Lab Results   Component Value Date    WBC 5.13 04/11/2022    WBC 5.60 03/01/2021    RBC 6.49 (H) 04/11/2022    RBC 6.65 (H) 03/01/2021    HGB 13.7 (L) 04/11/2022    HGB 14.2 03/01/2021    HCT 43.7 04/11/2022    HCT 45.8 03/01/2021    MCV 67 (L) 04/11/2022    MCV 69 (L) 03/01/2021    MCH 21.1 (L) 04/11/2022    MCH 21.4 (L) 03/01/2021    MCHC 31.4 (L) 04/11/2022    MCHC 31.0 (L) 03/01/2021    RDW 17.0 (H) 04/11/2022    RDW 17.8 (H) 03/01/2021     04/11/2022     03/01/2021    MPV 9.0 (L) 04/11/2022    MPV 9.8 03/01/2021    GRAN 3.8 03/01/2021    GRAN 67.6 03/01/2021    LYMPH 1.2 03/01/2021    LYMPH 22.0 03/01/2021    MONO 0.4 03/01/2021    MONO 7.5 03/01/2021    BASO 0.04 03/01/2021    BASO 0.04 08/22/2020    NRBC 0 03/01/2021    NRBC 0 08/22/2020       CHEMISTRY & LIVER FUNCTION - LAST 2  Lab Results   Component Value Date     04/11/2022     03/01/2021    K 4.7 04/11/2022    K 4.1 03/01/2021     04/11/2022     03/01/2021    CO2 28 04/11/2022    CO2 27 03/01/2021    ANIONGAP 9 04/11/2022    ANIONGAP 11 03/01/2021    BUN 19 04/11/2022    BUN 20 03/01/2021    CREATININE 1.2 04/11/2022    CREATININE 1.5 (H) 03/01/2021     (H) 04/11/2022    GLU 96 03/01/2021    CALCIUM 9.7 04/11/2022    CALCIUM 9.5 03/01/2021    MG 1.6 10/22/2019    ALBUMIN 4.6 04/11/2022    ALBUMIN 4.2 03/01/2021    PROT 7.1 04/11/2022    PROT 7.3 03/01/2021    ALKPHOS 74 04/11/2022    ALKPHOS 79 03/01/2021    ALT 47 (H) 04/11/2022    ALT 45 (H) 03/01/2021    AST 44 (H) 04/11/2022    AST 49 (H) 03/01/2021    BILITOT 1.1 (H) 04/11/2022    BILITOT 1.0 03/01/2021        CARDIAC PROFILE - LAST 2  Lab  Results   Component Value Date    BNP 16 10/21/2019    BNP <10 10/21/2019    TROPONINI 31.867 (HH) 10/22/2019    TROPONINI 45.108 (HH) 10/21/2019        COAGULATION - LAST 2  Lab Results   Component Value Date    INR 1.1 10/21/2019    APTT 28.4 10/21/2019    APTT 28.0 10/21/2019       ENDOCRINE & PSA - LAST 2  Lab Results   Component Value Date    HGBA1C 5.3 10/22/2019    TSH 1.340 10/22/2019    TSH 1.453 12/19/2012    PSA 1.3 03/01/2021    PSA 1.0 01/15/2016        ECHOCARDIOGRAM RESULTS  Results for orders placed during the hospital encounter of 10/21/19    Echo Color Flow Doppler? Yes; Bubble Contrast? No    Interpretation Summary  · Moderate concentric left ventricular hypertrophy.  · Normal left ventricular systolic function. The estimated ejection fraction is 68%  · Normal LV diastolic function.  · Normal right ventricular systolic function.  · Mild left atrial enlargement.  · Intermediate central venous pressure (8 mm Hg).      CURRENT/PREVIOUS VISIT EKG  Results for orders placed or performed during the hospital encounter of 10/21/19   EKG 12-LEAD starting tomorrow    Collection Time: 10/22/19  6:19 AM    Narrative    Test Reason : I21.4,    Vent. Rate : 064 BPM     Atrial Rate : 064 BPM     P-R Int : 164 ms          QRS Dur : 094 ms      QT Int : 386 ms       P-R-T Axes : 071 062 061 degrees     QTc Int : 398 ms    Normal sinus rhythm  Normal ECG  When compared with ECG of 21-OCT-2019 13:39,  No significant change was found  Confirmed by Berry DAVEY, Carlin IRAHETA ([id number]) on 10/24/2019 9:03:52 AM    Referred By: FRANCISCA   SELF           Confirmed By:Carlin Smart MD     No valid procedures specified.   No results found for this or any previous visit.    No valid procedures specified.    PHYSICAL EXAM  CONSTITUTIONAL: Well built, well nourished in no apparent distress  NECK: no carotid bruit, no JVD  LUNGS: CTA  CHEST WALL: no tenderness  HEART: regular rate and rhythm, S1, S2 normal, no murmur, click, rub or  gallop   ABDOMEN: soft, non-tender; bowel sounds normal; no masses,  no organomegaly  EXTREMITIES: Extremities normal, no edema, no calf tenderness noted  NEURO: AAO X 3    I HAVE REVIEWED :    The vital signs, nurses notes, and all the pertinent radiology and labs.        Current Outpatient Medications   Medication Instructions    aspirin (ECOTRIN) 81 mg, Oral, Daily    lisinopriL (PRINIVIL,ZESTRIL) 2.5 mg, Oral, 2 times daily    metoprolol succinate (TOPROL-XL) 25 mg, Oral, Daily    MV-MN/FA/LYCOPENE/LUT/HB#178 (LILLI MULTIVITAMIN FOR MEN ORAL) 2 tablets, Oral, 2 times daily,      rosuvastatin (CRESTOR) 10 mg, Oral, Nightly    ticagrelor (BRILINTA) 90 mg, Oral, 2 times daily          Assessment & Plan     S/P coronary artery stent placement  Is doing well with no recurrence of any angina.  Continue on risk factor modification for secondary prevention he is presently on Brilinta 90 mg p.o. b.i.d., enteric-coated aspirin 81 and continue on Crestor 10 mg daily his LDL cholesterol is below 50. He is already on a strict diet and he now plans to lose some weight as well    Hyperlipidemia  As above continue on arm Crestor 10 mg daily low-fat low-cholesterol diet LDL C is 42 are.  Maintain the same regimen    Family history of premature CAD  Risk factor modification for secondary prevention    NSTEMI (non-ST elevated myocardial infarction)  He has prior history of non-STEMI maintain on Toprol-XL are XL at g daily and low-dose of lisinopril 2.5 mg twice daily as well as are dual antiplatelet therapy.    Thalassemia minor  Hemoglobin is relatively stable arm on DA PT.          No follow-ups on file.

## 2022-04-19 NOTE — ASSESSMENT & PLAN NOTE
[facility] TEACHING SERVICE  SENIOR PROGRESS NOTE  24/7 MD PAGER      Date:  3/3/2021     I saw and evaluated the patient.      Video  was utilized during this encounter.    Brief HPI  The patient is a 51 year old male with a past medical history of CAD, cardiomyopathy, significant vascular disease requiring amputation and stenting. He is s/p AKA this hospitalization.     Vitals: Soft BPs  Labs: chronic anemia, elevated BUN    No concerns this morning. Reports being tired and unable to sleep 2/2 interruption. No breathing concerns.     BP 95/50 (BP Location: LUE - Left upper extremity, Patient Position: Supine)   Pulse 89   Temp 98.3 °F (36.8 °C) (Oral)   Resp 18   Ht 5' 7\" (1.702 m)   Wt 85.3 kg   BMI 29.45 kg/m²   BSA 1.97 m²     Exam significant for Prominent systolic murmur. Difficult to palpate, CTABL, Soft, Distended, Left BKA, Right AKA. Midline present. B/L cath site clean, dry, intact Wound vac in place over anterior right thigh. Appropriate mood and affect.     Plan     HTN, HFrEF:  -- Bumex 2 mg - per cards/nephro  -- PTA spirolactone, lisinopril, dig, ranolazine, bASA, plavix, statin  -- Hold imdur, metoprolol  -- daily wt, I/Os, FR, low Na diet    -- will need lifevest prior to discharge    Acute on chronic RLE ischemia w/ PAD, LLE ischemia, concern for wound dehiscence of Right CATH site   -- s/p vasc surg (R - AKA)  -- wound care recs  -- wound vac cares    Disposition:  Discharge pending placement - appreciate SW assistance    Please refer to Dr. Lindsey's note for more detailed information.    Shreya Delarosa MD   [facility]  Pager     Hemoglobin is relatively stable arm on DA PT.

## 2022-04-19 NOTE — ASSESSMENT & PLAN NOTE
As above continue on arm Crestor 10 mg daily low-fat low-cholesterol diet LDL C is 42 are.  Maintain the same regimen

## 2022-12-01 RX ORDER — ROSUVASTATIN CALCIUM 10 MG/1
10 TABLET, COATED ORAL NIGHTLY
Qty: 90 TABLET | Refills: 3 | Status: SHIPPED | OUTPATIENT
Start: 2022-12-01 | End: 2023-04-27 | Stop reason: SDUPTHER

## 2022-12-01 NOTE — TELEPHONE ENCOUNTER
----- Message from Julia Hdz sent at 12/1/2022 12:47 PM CST -----  Type: Needs Medical Advice  Who Called:  pt  Symptoms (please be specific):  pt said the pharmacy called and said they can not fill his rosuvastatin (CRESTOR) 10 MG tabletuntil they hear from the dr--he said he almost out and need to speak to the nurse--please call and advise  Best Call Back Number: [phone] (home)     Additional Information: thank you

## 2023-04-27 RX ORDER — METOPROLOL SUCCINATE 25 MG/1
25 TABLET, EXTENDED RELEASE ORAL DAILY
Qty: 30 TABLET | Refills: 0 | Status: SHIPPED | OUTPATIENT
Start: 2023-04-27 | End: 2023-05-01

## 2023-04-27 RX ORDER — LISINOPRIL 2.5 MG/1
2.5 TABLET ORAL 2 TIMES DAILY
Qty: 60 TABLET | Refills: 0 | Status: SHIPPED | OUTPATIENT
Start: 2023-04-27 | End: 2023-05-01

## 2023-04-27 RX ORDER — ROSUVASTATIN CALCIUM 10 MG/1
10 TABLET, COATED ORAL NIGHTLY
Qty: 30 TABLET | Refills: 0 | Status: SHIPPED | OUTPATIENT
Start: 2023-04-27 | End: 2023-05-11

## 2023-05-01 RX ORDER — METOPROLOL SUCCINATE 25 MG/1
TABLET, EXTENDED RELEASE ORAL
Qty: 90 TABLET | Refills: 3 | Status: SHIPPED | OUTPATIENT
Start: 2023-05-01 | End: 2023-07-24 | Stop reason: SDUPTHER

## 2023-05-01 RX ORDER — LISINOPRIL 2.5 MG/1
TABLET ORAL
Qty: 180 TABLET | Refills: 3 | Status: SHIPPED | OUTPATIENT
Start: 2023-05-01 | End: 2023-07-24 | Stop reason: SDUPTHER

## 2023-07-05 NOTE — TELEPHONE ENCOUNTER
----- Message from Carlo Patterson sent at 2023 10:44 AM CDT -----  Refill:   Name of Caller: Patient   Best Call Back Number: [phone]   Additional Information:  Patient is out he had to cut medication in half for it to last this week  Prescription Name:ticagrelor (BRILINTA) 90 mg tablet ()  Pharmacy Name:   [id number] - [place] [address] AT [facility] [address]   Phone:  [phone]  Fax:  [phone]

## 2023-07-24 RX ORDER — METOPROLOL SUCCINATE 25 MG/1
25 TABLET, EXTENDED RELEASE ORAL DAILY
Qty: 30 TABLET | Refills: 0 | Status: SHIPPED | OUTPATIENT
Start: 2023-07-24 | End: 2023-08-28 | Stop reason: SDUPTHER

## 2023-07-24 RX ORDER — LISINOPRIL 2.5 MG/1
2.5 TABLET ORAL 2 TIMES DAILY
Qty: 60 TABLET | Refills: 0 | Status: SHIPPED | OUTPATIENT
Start: 2023-07-24 | End: 2023-08-28 | Stop reason: SDUPTHER

## 2023-07-24 NOTE — TELEPHONE ENCOUNTER
----- Message from Marigrazyna Escobar sent at 7/24/2023 11:16 AM CDT -----  Contact: CHERELLE PEARSON [phone]  Type:  RX Refill Request        Who Called: CHERELLE PEARSON     Refill or New Rx:  REFILL    RX Name and Strength:    1. lisinopriL (PRINIVIL,ZESTRIL) 2.5 MG tablet  2.metoprolol succinate (TOPROL-XL) 25 MG 24 hr tablet    How is the patient currently taking it? (ex. 1XDay):  1X DAY     Is this a 30 day or 90 day RX:  30 DAY     Preferred Pharmacy with phone number:    [id number] - [place] - [address] [facility] [address]  [address] [facility] [address]  Phone: [phone] Fax: [phone]    Local or Mail Order:  LOCAL    Ordering Provider:  CHELSY Valenzuela Call Back Number:  [phone] (home)     Additional Information:  Patient is calling to request Rx refill on   1. lisinopriL (PRINIVIL,ZESTRIL) 2.5 MG tablet  2.metoprolol succinate (TOPROL-XL) 25 MG 24 hr tablet  Patient states he currently out of medication. Has an appt scheduled on 08-01-23  Please call back and advise. Thanks

## 2023-07-28 ENCOUNTER — TELEPHONE (OUTPATIENT)
Dept: CARDIOLOGY | Facility: CLINIC | Age: 58
End: 2023-07-28

## 2023-08-17 ENCOUNTER — OFFICE VISIT (OUTPATIENT)
Dept: URGENT CARE | Facility: CLINIC | Age: 58
End: 2023-08-17
Payer: COMMERCIAL

## 2023-08-17 VITALS
HEIGHT: 71 IN | SYSTOLIC BLOOD PRESSURE: 137 MMHG | TEMPERATURE: 97 F | DIASTOLIC BLOOD PRESSURE: 72 MMHG | OXYGEN SATURATION: 98 % | WEIGHT: 260.63 LBS | RESPIRATION RATE: 18 BRPM | BODY MASS INDEX: 36.49 KG/M2 | HEART RATE: 65 BPM

## 2023-08-17 DIAGNOSIS — S91.331A PUNCTURE WOUND OF RIGHT FOOT, INITIAL ENCOUNTER: Primary | ICD-10-CM

## 2023-08-17 PROCEDURE — 90471 IMMUNIZATION ADMIN: CPT | Mod: S$GLB,,, | Performed by: EMERGENCY MEDICINE

## 2023-08-17 PROCEDURE — 90715 TDAP VACCINE 7 YRS/> IM: CPT | Mod: S$GLB,,, | Performed by: EMERGENCY MEDICINE

## 2023-08-17 PROCEDURE — 99204 OFFICE O/P NEW MOD 45 MIN: CPT | Mod: 25,S$GLB,, | Performed by: NURSE PRACTITIONER

## 2023-08-17 PROCEDURE — 99204 PR OFFICE/OUTPT VISIT, NEW, LEVL IV, 45-59 MIN: ICD-10-PCS | Mod: 25,S$GLB,, | Performed by: NURSE PRACTITIONER

## 2023-08-17 PROCEDURE — 90471 TDAP VACCINE GREATER THAN OR EQUAL TO 7YO IM: ICD-10-PCS | Mod: S$GLB,,, | Performed by: EMERGENCY MEDICINE

## 2023-08-17 PROCEDURE — 90715 TDAP VACCINE GREATER THAN OR EQUAL TO 7YO IM: ICD-10-PCS | Mod: S$GLB,,, | Performed by: EMERGENCY MEDICINE

## 2023-08-17 RX ORDER — MUPIROCIN 20 MG/G
OINTMENT TOPICAL 3 TIMES DAILY
Qty: 22 G | Refills: 0 | Status: SHIPPED | OUTPATIENT
Start: 2023-08-17 | End: 2023-09-19

## 2023-08-17 RX ORDER — LEVOFLOXACIN 500 MG/1
500 TABLET, FILM COATED ORAL DAILY
Qty: 7 TABLET | Refills: 0 | Status: SHIPPED | OUTPATIENT
Start: 2023-08-17 | End: 2023-08-24

## 2023-08-17 RX ORDER — CEPHALEXIN 500 MG/1
500 CAPSULE ORAL EVERY 6 HOURS
Qty: 28 CAPSULE | Refills: 0 | Status: SHIPPED | OUTPATIENT
Start: 2023-08-17 | End: 2023-08-24

## 2023-08-17 NOTE — PATIENT INSTRUCTIONS
Start levofloxacin and cephalexin and take as prescribed  Limit high impact exercise while taking levofloxacin  May take cephalexin with food to limit GI distress  Keep wound clean and covered until healed  Wash wound with clean, soapy water and change dressing daily  Apply mupirocin ointment 2-3 x daily and with dressing changes  Follow up with PCP  Go directly to the er for any evidence of infection  Return to clinic for new concerns.

## 2023-08-17 NOTE — PROGRESS NOTES
"Subjective:      Patient ID: Enrico Talbot is a 58 y.o. male.    Vitals:  height is 5' 11" (1.803 m) and weight is 118.2 kg (260 lb 9.6 oz). His temperature is 97.4 °F (36.3 °C). His blood pressure is 137/72 and his pulse is 65. His respiration is 18 and oxygen saturation is 98%.     Chief Complaint: Foot Injury    58-year-old male seen for puncture wound of right plantar foot.  States proximally 1 hour ago he was walking through his yd and stepped on a board with the nail protruding.  He states the nail went through his shoe sole and puncture the plantar surface of his foot.  He was able to pull his foot off the board and states he held direct pressure on the wound stop the bleeding prior to arrival.  Last tdap 2019    Foot Injury   The incident occurred 1 to 3 hours ago. The incident occurred in the yard. Injury mechanism: stepped on nail. The pain is present in the right foot. The quality of the pain is described as aching. The pain is at a severity of 5/10. The pain is mild. The pain has been Worsening since onset. The treatment provided no relief.       Constitution: Negative for chills and fever.   HENT:  Negative for sore throat.    Cardiovascular:  Negative for chest pain and sob on exertion.   Musculoskeletal:  Positive for pain, trauma and pain with walking.   Skin:  Positive for skin thickening/induration, puncture wound and erythema.   Neurological:  Negative for dizziness, light-headedness, passing out, disorientation and altered mental status.   Psychiatric/Behavioral:  Negative for altered mental status, disorientation and confusion.       Objective:     Physical Exam   Constitutional: He is oriented to person, place, and time. He appears well-developed. He is cooperative.  Non-toxic appearance. He does not appear ill. No distress.   HENT:   Head: Normocephalic and atraumatic.   Ears:   Right Ear: External ear normal.   Left Ear: External ear normal.   Nose: Nose normal.   Mouth/Throat: " Oropharynx is clear and moist and mucous membranes are normal. Mucous membranes are moist. Oropharynx is clear.   Eyes: Conjunctivae and lids are normal. No scleral icterus.   Neck: Trachea normal and phonation normal. Neck supple.   Cardiovascular: Normal rate, regular rhythm, normal heart sounds and normal pulses.   Pulmonary/Chest: Effort normal and breath sounds normal. No stridor. No respiratory distress.   Abdominal: Normal appearance.   Musculoskeletal:         General: No deformity.        Feet:    Neurological: no focal deficit. He is alert and oriented to person, place, and time. He has normal strength and normal reflexes. No sensory deficit.   Skin: Skin is warm, dry, intact and not diaphoretic. Capillary refill takes 2 to 3 seconds. erythema   Psychiatric: His speech is normal and behavior is normal. Judgment and thought content normal.   Nursing note and vitals reviewed.      Assessment:     1. Puncture wound of right foot, initial encounter        Plan:       Puncture wound of right foot, initial encounter  -     Tdap Vaccine  -     mupirocin (BACTROBAN) 2 % ointment; Apply topically 3 (three) times daily. for 7 days  Dispense: 22 g; Refill: 0  -     levoFLOXacin (LEVAQUIN) 500 MG tablet; Take 1 tablet (500 mg total) by mouth once daily. for 7 days  Dispense: 7 tablet; Refill: 0  -     cephALEXin (KEFLEX) 500 MG capsule; Take 1 capsule (500 mg total) by mouth every 6 (six) hours. for 7 days  Dispense: 28 capsule; Refill: 0  -     HME - OTHER    I have discussed the physical exam findings with the patient. We discussed symptom monitoring, conservative care methods, medication use, and follow up orders. The patient verbalized understanding and agreement with the plan of care.

## 2023-08-28 RX ORDER — METOPROLOL SUCCINATE 25 MG/1
25 TABLET, EXTENDED RELEASE ORAL DAILY
Qty: 90 TABLET | Refills: 0 | Status: SHIPPED | OUTPATIENT
Start: 2023-08-28 | End: 2024-02-02 | Stop reason: SDUPTHER

## 2023-08-28 RX ORDER — ROSUVASTATIN CALCIUM 10 MG/1
10 TABLET, COATED ORAL NIGHTLY
Qty: 90 TABLET | Refills: 0 | Status: SHIPPED | OUTPATIENT
Start: 2023-08-28 | End: 2024-01-05 | Stop reason: SDUPTHER

## 2023-08-28 RX ORDER — LISINOPRIL 2.5 MG/1
2.5 TABLET ORAL 2 TIMES DAILY
Qty: 180 TABLET | Refills: 0 | Status: SHIPPED | OUTPATIENT
Start: 2023-08-28

## 2023-08-28 NOTE — TELEPHONE ENCOUNTER
----- Message from Carlo Patterson sent at 2023 10:30 AM CDT -----  Refill:   Name of Caller: Patient  Best Call Back Number:  [phone]  Additional Information: Patient asked for a 3 month refill so he wont have to call every month for a refill, he is currently out of 2 of the medications below, he asked if it can be sent to the pharmacy today  Prescription Name:  lisinopriL (PRINIVIL,ZESTRIL) 2.5 MG tablet  metoprolol succinate (TOPROL-XL) 25 MG 24 hr tablet  rosuvastatin (CRESTOR) 10 MG tablet  ticagrelor (BRILINTA) 90 mg tablet ()  Pharmacy Name:   [id number] - [facility] [address] AT [facility] [address]   Phone:  [phone]  Fax:  [phone]

## 2023-09-19 ENCOUNTER — OFFICE VISIT (OUTPATIENT)
Dept: CARDIOLOGY | Facility: CLINIC | Age: 58
End: 2023-09-19
Payer: COMMERCIAL

## 2023-09-19 ENCOUNTER — LAB VISIT (OUTPATIENT)
Dept: LAB | Facility: HOSPITAL | Age: 58
End: 2023-09-19
Attending: NURSE PRACTITIONER
Payer: COMMERCIAL

## 2023-09-19 VITALS
WEIGHT: 265 LBS | HEIGHT: 71 IN | SYSTOLIC BLOOD PRESSURE: 134 MMHG | BODY MASS INDEX: 37.1 KG/M2 | DIASTOLIC BLOOD PRESSURE: 76 MMHG

## 2023-09-19 DIAGNOSIS — Z95.5 S/P CORONARY ARTERY STENT PLACEMENT: Primary | ICD-10-CM

## 2023-09-19 DIAGNOSIS — D56.3 HETEROZYGOUS THALASSEMIA: Primary | ICD-10-CM

## 2023-09-19 DIAGNOSIS — Z95.5 S/P CORONARY ARTERY STENT PLACEMENT: ICD-10-CM

## 2023-09-19 DIAGNOSIS — E78.00 PURE HYPERCHOLESTEROLEMIA: ICD-10-CM

## 2023-09-19 DIAGNOSIS — D56.3 THALASSEMIA MINOR: ICD-10-CM

## 2023-09-19 LAB
ALBUMIN SERPL BCP-MCNC: 4.5 G/DL (ref 3.5–5.2)
ALP SERPL-CCNC: 67 U/L (ref 55–135)
ALT SERPL W/O P-5'-P-CCNC: 45 U/L (ref 10–44)
ANION GAP SERPL CALC-SCNC: 5 MMOL/L (ref 8–16)
AST SERPL-CCNC: 53 U/L (ref 10–40)
BASOPHILS # BLD AUTO: 0.06 K/UL (ref 0–0.2)
BASOPHILS NFR BLD: 0.9 % (ref 0–1.9)
BILIRUB SERPL-MCNC: 1.3 MG/DL (ref 0.1–1)
BUN SERPL-MCNC: 22 MG/DL (ref 6–20)
CALCIUM SERPL-MCNC: 9.5 MG/DL (ref 8.7–10.5)
CHLORIDE SERPL-SCNC: 106 MMOL/L (ref 95–110)
CHOLEST SERPL-MCNC: 99 MG/DL (ref 120–199)
CHOLEST/HDLC SERPL: 3.1 {RATIO} (ref 2–5)
CO2 SERPL-SCNC: 28 MMOL/L (ref 23–29)
CREAT SERPL-MCNC: 1.3 MG/DL (ref 0.5–1.4)
DIFFERENTIAL METHOD: ABNORMAL
EOSINOPHIL # BLD AUTO: 0.2 K/UL (ref 0–0.5)
EOSINOPHIL NFR BLD: 2.4 % (ref 0–8)
ERYTHROCYTE [DISTWIDTH] IN BLOOD BY AUTOMATED COUNT: 17.3 % (ref 11.5–14.5)
EST. GFR  (NO RACE VARIABLE): >60 ML/MIN/1.73 M^2
GLUCOSE SERPL-MCNC: 118 MG/DL (ref 70–110)
HCT VFR BLD AUTO: 45.9 % (ref 40–54)
HDLC SERPL-MCNC: 32 MG/DL (ref 40–75)
HDLC SERPL: 32.3 % (ref 20–50)
HGB BLD-MCNC: 14.2 G/DL (ref 14–18)
IMM GRANULOCYTES # BLD AUTO: 0.02 K/UL (ref 0–0.04)
IMM GRANULOCYTES NFR BLD AUTO: 0.3 % (ref 0–0.5)
LDLC SERPL CALC-MCNC: 44.8 MG/DL (ref 63–159)
LYMPHOCYTES # BLD AUTO: 1.6 K/UL (ref 1–4.8)
LYMPHOCYTES NFR BLD: 24.1 % (ref 18–48)
MCH RBC QN AUTO: 21.3 PG (ref 27–31)
MCHC RBC AUTO-ENTMCNC: 30.9 G/DL (ref 32–36)
MCV RBC AUTO: 69 FL (ref 82–98)
MONOCYTES # BLD AUTO: 0.5 K/UL (ref 0.3–1)
MONOCYTES NFR BLD: 7.6 % (ref 4–15)
NEUTROPHILS # BLD AUTO: 4.4 K/UL (ref 1.8–7.7)
NEUTROPHILS NFR BLD: 64.7 % (ref 38–73)
NONHDLC SERPL-MCNC: 67 MG/DL
NRBC BLD-RTO: 0 /100 WBC
PLATELET # BLD AUTO: 175 K/UL (ref 150–450)
PMV BLD AUTO: 9.3 FL (ref 9.2–12.9)
POTASSIUM SERPL-SCNC: 4.7 MMOL/L (ref 3.5–5.1)
PROT SERPL-MCNC: 7.3 G/DL (ref 6–8.4)
RBC # BLD AUTO: 6.67 M/UL (ref 4.6–6.2)
SODIUM SERPL-SCNC: 139 MMOL/L (ref 136–145)
TRIGL SERPL-MCNC: 111 MG/DL (ref 30–150)
WBC # BLD AUTO: 6.73 K/UL (ref 3.9–12.7)

## 2023-09-19 PROCEDURE — 99214 PR OFFICE/OUTPT VISIT, EST, LEVL IV, 30-39 MIN: ICD-10-PCS | Mod: S$GLB,,, | Performed by: NURSE PRACTITIONER

## 2023-09-19 PROCEDURE — 1159F PR MEDICATION LIST DOCUMENTED IN MEDICAL RECORD: ICD-10-PCS | Mod: CPTII,S$GLB,, | Performed by: NURSE PRACTITIONER

## 2023-09-19 PROCEDURE — 3075F PR MOST RECENT SYSTOLIC BLOOD PRESS GE 130-139MM HG: ICD-10-PCS | Mod: CPTII,S$GLB,, | Performed by: NURSE PRACTITIONER

## 2023-09-19 PROCEDURE — 99999 PR PBB SHADOW E&M-EST. PATIENT-LVL III: CPT | Mod: PBBFAC,,, | Performed by: NURSE PRACTITIONER

## 2023-09-19 PROCEDURE — 1159F MED LIST DOCD IN RCRD: CPT | Mod: CPTII,S$GLB,, | Performed by: NURSE PRACTITIONER

## 2023-09-19 PROCEDURE — 36415 COLL VENOUS BLD VENIPUNCTURE: CPT | Performed by: NURSE PRACTITIONER

## 2023-09-19 PROCEDURE — 3078F DIAST BP <80 MM HG: CPT | Mod: CPTII,S$GLB,, | Performed by: NURSE PRACTITIONER

## 2023-09-19 PROCEDURE — 99214 OFFICE O/P EST MOD 30 MIN: CPT | Mod: S$GLB,,, | Performed by: NURSE PRACTITIONER

## 2023-09-19 PROCEDURE — 80053 COMPREHEN METABOLIC PANEL: CPT | Performed by: NURSE PRACTITIONER

## 2023-09-19 PROCEDURE — 3075F SYST BP GE 130 - 139MM HG: CPT | Mod: CPTII,S$GLB,, | Performed by: NURSE PRACTITIONER

## 2023-09-19 PROCEDURE — 3008F PR BODY MASS INDEX (BMI) DOCUMENTED: ICD-10-PCS | Mod: CPTII,S$GLB,, | Performed by: NURSE PRACTITIONER

## 2023-09-19 PROCEDURE — 4010F PR ACE/ARB THEARPY RXD/TAKEN: ICD-10-PCS | Mod: CPTII,S$GLB,, | Performed by: NURSE PRACTITIONER

## 2023-09-19 PROCEDURE — 85025 COMPLETE CBC W/AUTO DIFF WBC: CPT | Performed by: NURSE PRACTITIONER

## 2023-09-19 PROCEDURE — 3008F BODY MASS INDEX DOCD: CPT | Mod: CPTII,S$GLB,, | Performed by: NURSE PRACTITIONER

## 2023-09-19 PROCEDURE — 93000 ELECTROCARDIOGRAM COMPLETE: CPT | Mod: S$GLB,,, | Performed by: INTERNAL MEDICINE

## 2023-09-19 PROCEDURE — 93000 EKG 12-LEAD: ICD-10-PCS | Mod: S$GLB,,, | Performed by: INTERNAL MEDICINE

## 2023-09-19 PROCEDURE — 80061 LIPID PANEL: CPT | Performed by: NURSE PRACTITIONER

## 2023-09-19 PROCEDURE — 3078F PR MOST RECENT DIASTOLIC BLOOD PRESSURE < 80 MM HG: ICD-10-PCS | Mod: CPTII,S$GLB,, | Performed by: NURSE PRACTITIONER

## 2023-09-19 PROCEDURE — 84402 ASSAY OF FREE TESTOSTERONE: CPT | Performed by: NURSE PRACTITIONER

## 2023-09-19 PROCEDURE — 99999 PR PBB SHADOW E&M-EST. PATIENT-LVL III: ICD-10-PCS | Mod: PBBFAC,,, | Performed by: NURSE PRACTITIONER

## 2023-09-19 PROCEDURE — 84403 ASSAY OF TOTAL TESTOSTERONE: CPT | Performed by: NURSE PRACTITIONER

## 2023-09-19 PROCEDURE — 4010F ACE/ARB THERAPY RXD/TAKEN: CPT | Mod: CPTII,S$GLB,, | Performed by: NURSE PRACTITIONER

## 2023-09-19 RX ORDER — ASPIRIN 81 MG/1
81 TABLET ORAL DAILY
Refills: 0 | COMMUNITY
Start: 2023-09-19

## 2023-09-19 NOTE — PROGRESS NOTES
[facility]    Subjective:     Patient ID:  Enrico Talbot is a 58 y.o. male patient here for evaluation Follow-up and Bradycardia (At night )      HPI    Enrico Talbot is here for follow-up visit. Denies chest pain or shortness of breath. Denies recent fever cough chills or congestion. Denies blood in the urine or blood in the stool. Denies myalgias. Denies orthopnea or peripheral edema. Denies nausea vomiting or dyspepsia. No recent arm neck or jaw pain. No lightheadedness or dizziness. Does not eat pork or red meat. He exercises 5 days a week and walks 25 miles per week. He says at night his HR gets down to 47. He denies any symptoms. He has only been taking 45 mg of Brilinta BID.      Review of Systems   All other systems reviewed and are negative.       Past Medical History:   Diagnosis Date    Anemia     AR (allergic rhinitis)        Past Surgical History:   Procedure Laterality Date    CORONARY ANGIOGRAPHY Right 10/21/2019    Procedure: Left heart cath and possible PCI;  Surgeon: Scooby Mejia MD;  Location: [facility] CATH/EP LAB;  Service: Cardiology;  Laterality: Right;    LEFT HEART CATHETERIZATION Left 10/21/2019    Procedure: Left heart cath;  Surgeon: Scooby Mejia MD;  Location: [facility] CATH/EP LAB;  Service: Cardiology;  Laterality: Left;    REFRACTIVE SURGERY  1998    SINUS SURGERY  1995       Family History   Problem Relation Age of Onset    Dementia Mother     Heart disease Father         first MI at 63    Heart failure Father        Social History     Socioeconomic History    Marital status:     Number of children: 4   Tobacco Use    Smoking status: Never    Smokeless tobacco: Never   Substance and Sexual Activity    Alcohol use: No     Comment: QUIT 1985    Drug use: No    Sexual activity: Yes     Partners: Female   Social History Narrative    The patient does not exercise regularly (recentlky began exercising  again).    Rates diet as good.    He is not satisfied with weight.               Current Outpatient Medications   Medication Sig Dispense Refill    lisinopriL (PRINIVIL,ZESTRIL) 2.5 MG tablet Take 1 tablet (2.5 mg total) by mouth 2 (two) times daily. 180 tablet 0    metoprolol succinate (TOPROL-XL) 25 MG 24 hr tablet Take 1 tablet (25 mg total) by mouth once daily. 90 tablet 0    MV-MN/FA/LYCOPENE/LUT/HB#178 (LILLI MULTIVITAMIN FOR MEN ORAL) Take 2 tablets by mouth 2 (two) times a day.      rosuvastatin (CRESTOR) 10 MG tablet Take 1 tablet (10 mg total) by mouth every evening. 90 tablet 0    aspirin (ECOTRIN) 81 MG EC tablet Take 1 tablet (81 mg total) by mouth once daily.  0     No current facility-administered medications for this visit.       Review of patient's allergies indicates:   Allergen Reactions    Lactose intolerance [lactase]          Objective:        Vitals:    09/19/23 0843   BP: 134/76       Physical Exam  Vitals and nursing note reviewed.   Constitutional:       Appearance: Normal appearance.   HENT:      Head: Normocephalic.      Nose: Nose normal.   Cardiovascular:      Rate and Rhythm: Normal rate and regular rhythm.   Pulmonary:      Effort: Pulmonary effort is normal.      Breath sounds: Normal breath sounds.   Musculoskeletal:         General: Normal range of motion.   Skin:     General: Skin is warm and dry.      Capillary Refill: Capillary refill takes less than 2 seconds.   Neurological:      General: No focal deficit present.      Mental Status: He is alert and oriented to person, place, and time.       LIPIDS - LAST 2   Lab Results   Component Value Date    CHOL 99 (L) 04/11/2022    CHOL 93 (L) 03/01/2021    HDL 32 (L) 04/11/2022    HDL 28 (L) 03/01/2021    LDLCALC 41.6 (L) 04/11/2022    LDLCALC 18.2 (L) 03/01/2021    TRIG 127 04/11/2022    TRIG 234 (H) 03/01/2021    CHOLHDL 32.3 04/11/2022    CHOLHDL 30.1 03/01/2021       CBC - LAST 2  Lab Results   Component Value Date    WBC 5.13  04/11/2022    WBC 5.60 03/01/2021    RBC 6.49 (H) 04/11/2022    RBC 6.65 (H) 03/01/2021    HGB 13.7 (L) 04/11/2022    HGB 14.2 03/01/2021    HCT 43.7 04/11/2022    HCT 45.8 03/01/2021    MCV 67 (L) 04/11/2022    MCV 69 (L) 03/01/2021    MCH 21.1 (L) 04/11/2022    MCH 21.4 (L) 03/01/2021    MCHC 31.4 (L) 04/11/2022    MCHC 31.0 (L) 03/01/2021    RDW 17.0 (H) 04/11/2022    RDW 17.8 (H) 03/01/2021     04/11/2022     03/01/2021    MPV 9.0 (L) 04/11/2022    MPV 9.8 03/01/2021    GRAN 3.8 03/01/2021    GRAN 67.6 03/01/2021    LYMPH 1.2 03/01/2021    LYMPH 22.0 03/01/2021    MONO 0.4 03/01/2021    MONO 7.5 03/01/2021    BASO 0.04 03/01/2021    BASO 0.04 08/22/2020    NRBC 0 03/01/2021    NRBC 0 08/22/2020       CHEMISTRY & LIVER FUNCTION - LAST 2  Lab Results   Component Value Date     04/11/2022     03/01/2021    K 4.7 04/11/2022    K 4.1 03/01/2021     04/11/2022     03/01/2021    CO2 28 04/11/2022    CO2 27 03/01/2021    ANIONGAP 9 04/11/2022    ANIONGAP 11 03/01/2021    BUN 19 04/11/2022    BUN 20 03/01/2021    CREATININE 1.2 04/11/2022    CREATININE 1.5 (H) 03/01/2021     (H) 04/11/2022    GLU 96 03/01/2021    CALCIUM 9.7 04/11/2022    CALCIUM 9.5 03/01/2021    MG 1.6 10/22/2019    ALBUMIN 4.6 04/11/2022    ALBUMIN 4.2 03/01/2021    PROT 7.1 04/11/2022    PROT 7.3 03/01/2021    ALKPHOS 74 04/11/2022    ALKPHOS 79 03/01/2021    ALT 47 (H) 04/11/2022    ALT 45 (H) 03/01/2021    AST 44 (H) 04/11/2022    AST 49 (H) 03/01/2021    BILITOT 1.1 (H) 04/11/2022    BILITOT 1.0 03/01/2021        CARDIAC PROFILE - LAST 2  Lab Results   Component Value Date    BNP 16 10/21/2019    BNP <10 10/21/2019    TROPONINI 31.867 () 10/22/2019    TROPONINI 45.108 () 10/21/2019        COAGULATION - LAST 2  Lab Results   Component Value Date    INR 1.1 10/21/2019    APTT 28.4 10/21/2019    APTT 28.0 10/21/2019       ENDOCRINE & PSA - LAST 2  Lab Results   Component Value Date    HGBA1C 5.3  10/22/2019    TSH 1.340 10/22/2019    TSH 1.453 12/19/2012    PSA 1.3 03/01/2021    PSA 1.0 01/15/2016        ECHOCARDIOGRAM RESULTS  Results for orders placed during the hospital encounter of 10/21/19    Echo Color Flow Doppler? Yes; Bubble Contrast? No    Interpretation Summary  · Moderate concentric left ventricular hypertrophy.  · Normal left ventricular systolic function. The estimated ejection fraction is 68%  · Normal LV diastolic function.  · Normal right ventricular systolic function.  · Mild left atrial enlargement.  · Intermediate central venous pressure (8 mm Hg).      CURRENT/PREVIOUS VISIT EKG  Results for orders placed or performed during the hospital encounter of 10/21/19   EKG 12-LEAD starting tomorrow    Collection Time: 10/22/19  6:19 AM    Narrative    Test Reason : I21.4,    Vent. Rate : 064 BPM     Atrial Rate : 064 BPM     P-R Int : 164 ms          QRS Dur : 094 ms      QT Int : 386 ms       P-R-T Axes : 071 062 061 degrees     QTc Int : 398 ms    Normal sinus rhythm  Normal ECG  When compared with ECG of 21-OCT-2019 13:39,  No significant change was found  Confirmed by Berry DAVEY, Carlin IRAHETA ([id number]) on 10/24/2019 9:03:52 AM    Referred By: [email or web address]   SELF           Confirmed By:Carlin Smart MD             Assessment:       1. S/P coronary artery stent placement    2. Pure hypercholesterolemia    3. Thalassemia minor             DC Brilinta been since 2019 stent to LCX  Continue ASA Daily  Continue statin   Check labs  F/U 3-4 Mos time  Continue strict diet and exercise        Problem List Items Addressed This Visit          Cardiac/Vascular    S/P coronary artery stent placement - Primary    Pure hypercholesterolemia       Oncology    Thalassemia minor          [facility]

## 2023-09-20 LAB — TESTOST SERPL-MCNC: 341 NG/DL (ref 264–916)

## 2023-09-27 LAB — TESTOST FREE SERPL-MCNC: 10.4 PG/ML (ref 7.2–24)

## 2024-01-05 NOTE — TELEPHONE ENCOUNTER
----- Message from Isaac Kebede sent at 1/5/2024  2:43 PM CST -----  Contact: Self  Type:  RX Refill Request    Who Called:  Patient  Refill or New Rx:  Refill  RX Name and Strength:  rosuvastatin (CRESTOR) 10 MG tablet  How is the patient currently taking it? (ex. 1XDay):  as directed  Is this a 30 day or 90 day RX:  90  Preferred Pharmacy with phone number:      [facility] #[id number] - [place] - [address] AT [facility]  [address]  [place] [address]  Phone: [phone] Fax: [phone]      Local or Mail Order:  Local  Ordering Provider:  BRENDA  Best Call Back Number:  [phone]   Additional Information:

## 2024-01-06 RX ORDER — ROSUVASTATIN CALCIUM 10 MG/1
10 TABLET, COATED ORAL NIGHTLY
Qty: 90 TABLET | Refills: 3 | Status: SHIPPED | OUTPATIENT
Start: 2024-01-06 | End: 2025-01-05

## 2024-02-02 RX ORDER — METOPROLOL SUCCINATE 25 MG/1
25 TABLET, EXTENDED RELEASE ORAL DAILY
Qty: 90 TABLET | Refills: 3 | Status: SHIPPED | OUTPATIENT
Start: 2024-02-02 | End: 2024-05-10

## 2024-02-02 NOTE — TELEPHONE ENCOUNTER
----- Message from Nichelle Lindsay sent at 2/2/2024 11:04 AM CST -----  Contact: self  Type:  RX Refill Request    Who Called: Pt  Refill or New Rx:Refill   RX Name and Strength:metoprolol succinate (TOPROL-XL) 25 MG 24 hr tablet  How is the patient currently taking it? (ex. 1XDay): as directed  Is this a 30 day or 90 day RX: 90  Preferred Pharmacy with phone number:   [id number] - [place] - [address] [facility] [address]  [address] [address]  Phone: [phone] Fax: [phone]  Local or Mail Order: Local  Ordering Provider: Dr. BONNIE Mejia  Would the patient rather a call back or a response via [facility]? Call  Best Call Back Number: [phone]  Pt is out of meds.... Thank you

## 2024-03-12 ENCOUNTER — OFFICE VISIT (OUTPATIENT)
Dept: URGENT CARE | Facility: CLINIC | Age: 59
End: 2024-03-12
Payer: COMMERCIAL

## 2024-03-12 VITALS
RESPIRATION RATE: 17 BRPM | HEIGHT: 71 IN | OXYGEN SATURATION: 99 % | TEMPERATURE: 97 F | WEIGHT: 240 LBS | BODY MASS INDEX: 33.6 KG/M2 | SYSTOLIC BLOOD PRESSURE: 135 MMHG | DIASTOLIC BLOOD PRESSURE: 74 MMHG | HEART RATE: 66 BPM

## 2024-03-12 DIAGNOSIS — R09.82 POSTNASAL DRIP: ICD-10-CM

## 2024-03-12 DIAGNOSIS — J01.00 ACUTE NON-RECURRENT MAXILLARY SINUSITIS: ICD-10-CM

## 2024-03-12 DIAGNOSIS — J40 BRONCHITIS: Primary | ICD-10-CM

## 2024-03-12 PROCEDURE — 99214 OFFICE O/P EST MOD 30 MIN: CPT | Mod: S$GLB,,, | Performed by: NURSE PRACTITIONER

## 2024-03-12 RX ORDER — BENZONATATE 200 MG/1
200 CAPSULE ORAL EVERY 8 HOURS PRN
Qty: 21 CAPSULE | Refills: 0 | Status: SHIPPED | OUTPATIENT
Start: 2024-03-12 | End: 2024-05-03

## 2024-03-12 RX ORDER — FLUTICASONE PROPIONATE 50 MCG
2 SPRAY, SUSPENSION (ML) NASAL DAILY
Qty: 11.1 ML | Refills: 0 | Status: SHIPPED | OUTPATIENT
Start: 2024-03-12 | End: 2024-03-22

## 2024-03-12 RX ORDER — PREDNISONE 20 MG/1
20 TABLET ORAL 2 TIMES DAILY
Qty: 6 TABLET | Refills: 0 | Status: SHIPPED | OUTPATIENT
Start: 2024-03-12 | End: 2024-03-15

## 2024-03-12 RX ORDER — AZITHROMYCIN 250 MG/1
TABLET, FILM COATED ORAL
Qty: 6 TABLET | Refills: 0 | Status: SHIPPED | OUTPATIENT
Start: 2024-03-12 | End: 2024-03-17

## 2024-03-12 RX ORDER — ALBUTEROL SULFATE 90 UG/1
2 AEROSOL, METERED RESPIRATORY (INHALATION) EVERY 6 HOURS PRN
Qty: 8 G | Refills: 0 | Status: SHIPPED | OUTPATIENT
Start: 2024-03-12

## 2024-03-12 NOTE — PATIENT INSTRUCTIONS
Take medication as prescribed.  Follow up with your primary care doctor this week for close interval follow-up.  Emergency room precautions for any shortness of breath, chest pain, or any other acutely worsening symptoms or concerns at all.

## 2024-03-12 NOTE — PROGRESS NOTES
"Subjective:      Patient ID: Enrico Talbot is a 58 y.o. male.    Vitals:  height is 5' 11" (1.803 m) and weight is 108.9 kg (240 lb). His temperature is 97.4 °F (36.3 °C). His blood pressure is 135/74 and his pulse is 66. His respiration is 17 and oxygen saturation is 99%.     Chief Complaint: Cough and Sinus Problem    58-year-old male presents with sinus congestion, headaches, chest congestion for about 10 days.  He denies any shortness a breath or chest pain.  He denies any fevers.  He states that he is taken Mucinex and Claritin D. he declines wanting to be tested for COVID or flu today.    Cough  This is a new problem. The current episode started in the past 7 days. The problem has been gradually worsening. The cough is Productive of sputum. Associated symptoms include nasal congestion, postnasal drip and rhinorrhea. Pertinent negatives include no chest pain, chills, ear pain, fever, headaches, rash, sore throat, shortness of breath or wheezing. Nothing aggravates the symptoms. Treatments tried: mucinex and claritin D. The treatment provided mild relief. His past medical history is significant for bronchitis.       Constitution: Negative for chills, sweating, fatigue, fever and generalized weakness.   HENT:  Positive for congestion, postnasal drip, sinus pain and sinus pressure. Negative for ear pain, sore throat and trouble swallowing.    Neck: Negative for neck pain and neck stiffness.   Cardiovascular:  Negative for chest pain and sob on exertion.   Respiratory:  Positive for cough. Negative for sputum production, shortness of breath and wheezing.    Gastrointestinal:  Negative for abdominal pain, nausea, vomiting and diarrhea.   Genitourinary:  Negative for dysuria.   Musculoskeletal:  Negative for pain.   Skin:  Negative for color change and rash.   Neurological:  Negative for dizziness and headaches.      Objective:     Physical Exam   Constitutional: He is oriented to person, place, and time. " He appears well-developed. He is cooperative.  Non-toxic appearance. He does not appear ill. No distress.   HENT:   Head: Normocephalic and atraumatic.   Ears:   Right Ear: Hearing, tympanic membrane, external ear and ear canal normal.   Left Ear: Hearing, tympanic membrane, external ear and ear canal normal.   Nose: Rhinorrhea and congestion present. No mucosal edema or nasal deformity. No epistaxis. Right sinus exhibits no maxillary sinus tenderness and no frontal sinus tenderness. Left sinus exhibits no maxillary sinus tenderness and no frontal sinus tenderness.   Mouth/Throat: Uvula is midline and mucous membranes are normal. No trismus in the jaw. Normal dentition. No uvula swelling. Posterior oropharyngeal erythema present. No oropharyngeal exudate or posterior oropharyngeal edema.      Comments: Uvula midline. PND noted to posterior pharynx. No tonsillitis or exudate  Eyes: Conjunctivae and lids are normal. No scleral icterus.   Neck: Trachea normal and phonation normal. Neck supple. No edema present. No erythema present. No neck rigidity present.   Cardiovascular: Normal rate, regular rhythm, normal heart sounds and normal pulses.   Pulmonary/Chest: Effort normal and breath sounds normal. No stridor. No respiratory distress. He has no decreased breath sounds. He has no wheezes. He has no rhonchi. He has no rales.         Comments: No adventitious lung sounds    Abdominal: Normal appearance. He exhibits no distension.   Musculoskeletal: Normal range of motion.         General: No deformity. Normal range of motion.   Neurological: He is alert and oriented to person, place, and time. He exhibits normal muscle tone. Coordination normal.   Skin: Skin is warm, dry, intact, not diaphoretic and not pale.   Psychiatric: His speech is normal and behavior is normal. Judgment and thought content normal.   Nursing note and vitals reviewed.      Assessment:     1. Bronchitis    2. Acute non-recurrent maxillary sinusitis     3. Postnasal drip        Plan:     Nontoxic-appearing 58-year-old male presents with cough and congestion for about 10 days.  He denies any shortness a breath or chest pain.  He declines COVID or flu testing today.  No adventitious lung sounds.  Vital signs are stable.  Due to duration of illness, I discussed with him he likely has sinusitis and bronchitis. Will start him on Azithromycin. Follow up with your primary care doctor this week for close interval follow-up.  Emergency room precautions for any shortness of breath, chest pain, or any other acutely worsening symptoms or concerns at all.    Bronchitis    Acute non-recurrent maxillary sinusitis    Postnasal drip    Other orders  -     azithromycin (Z-CARMEN) 250 MG tablet; Take 2 tablets by mouth on day 1; Take 1 tablet by mouth on days 2-5  Dispense: 6 tablet; Refill: 0  -     predniSONE (DELTASONE) 20 MG tablet; Take 1 tablet (20 mg total) by mouth 2 (two) times daily. for 3 days  Dispense: 6 tablet; Refill: 0  -     albuterol (VENTOLIN HFA) 90 mcg/actuation inhaler; Inhale 2 puffs into the lungs every 6 (six) hours as needed for Wheezing or Shortness of Breath. Rescue  Dispense: 8 g; Refill: 0  -     benzonatate (TESSALON) 200 MG capsule; Take 1 capsule (200 mg total) by mouth every 8 (eight) hours as needed for Cough.  Dispense: 21 capsule; Refill: 0  -     fluticasone propionate (FLONASE) 50 mcg/actuation nasal spray; 2 sprays (100 mcg total) by Each Nostril route once daily. for 10 days  Dispense: 11.1 mL; Refill: 0

## 2024-03-15 ENCOUNTER — HOSPITAL ENCOUNTER (OUTPATIENT)
Dept: RADIOLOGY | Facility: CLINIC | Age: 59
Discharge: HOME | End: 2024-03-15
Attending: NURSE PRACTITIONER
Payer: COMMERCIAL

## 2024-03-15 ENCOUNTER — OFFICE VISIT (OUTPATIENT)
Dept: URGENT CARE | Facility: CLINIC | Age: 59
End: 2024-03-15
Payer: COMMERCIAL

## 2024-03-15 VITALS
RESPIRATION RATE: 18 BRPM | HEART RATE: 82 BPM | DIASTOLIC BLOOD PRESSURE: 84 MMHG | TEMPERATURE: 98 F | SYSTOLIC BLOOD PRESSURE: 153 MMHG | OXYGEN SATURATION: 98 % | BODY MASS INDEX: 33.88 KG/M2 | HEIGHT: 71 IN | WEIGHT: 242 LBS

## 2024-03-15 DIAGNOSIS — R05.2 SUBACUTE COUGH: ICD-10-CM

## 2024-03-15 DIAGNOSIS — J01.40 ACUTE PANSINUSITIS, RECURRENCE NOT SPECIFIED: ICD-10-CM

## 2024-03-15 DIAGNOSIS — Z20.822 COVID-19 VIRUS NOT DETECTED: ICD-10-CM

## 2024-03-15 DIAGNOSIS — R05.2 SUBACUTE COUGH: Primary | ICD-10-CM

## 2024-03-15 LAB
CTP QC/QA: YES
CTP QC/QA: YES
FLUAV AG NPH QL: NEGATIVE
FLUBV AG NPH QL: NEGATIVE
SARS-COV-2 AG RESP QL IA.RAPID: NEGATIVE

## 2024-03-15 PROCEDURE — 99214 OFFICE O/P EST MOD 30 MIN: CPT | Mod: S$GLB,,, | Performed by: NURSE PRACTITIONER

## 2024-03-15 PROCEDURE — 87804 INFLUENZA ASSAY W/OPTIC: CPT | Mod: QW,,, | Performed by: NURSE PRACTITIONER

## 2024-03-15 PROCEDURE — 71046 X-RAY EXAM CHEST 2 VIEWS: CPT | Mod: 26,,, | Performed by: RADIOLOGY

## 2024-03-15 PROCEDURE — 71046 X-RAY EXAM CHEST 2 VIEWS: CPT | Mod: TC,FY,PO

## 2024-03-15 PROCEDURE — 94640 AIRWAY INHALATION TREATMENT: CPT | Mod: 59,S$GLB,, | Performed by: NURSE PRACTITIONER

## 2024-03-15 PROCEDURE — 87811 SARS-COV-2 COVID19 W/OPTIC: CPT | Mod: QW,S$GLB,, | Performed by: NURSE PRACTITIONER

## 2024-03-15 RX ORDER — METHYLPREDNISOLONE 4 MG/1
TABLET ORAL
Qty: 21 EACH | Refills: 0 | Status: SHIPPED | OUTPATIENT
Start: 2024-03-15 | End: 2024-05-03

## 2024-03-15 RX ORDER — DOXYCYCLINE 100 MG/1
100 CAPSULE ORAL EVERY 12 HOURS
Qty: 14 CAPSULE | Refills: 0 | Status: SHIPPED | OUTPATIENT
Start: 2024-03-15 | End: 2024-03-22

## 2024-03-15 RX ORDER — IPRATROPIUM BROMIDE 0.5 MG/2.5ML
0.5 SOLUTION RESPIRATORY (INHALATION)
Status: COMPLETED | OUTPATIENT
Start: 2024-03-15 | End: 2024-03-15

## 2024-03-15 RX ORDER — PROMETHAZINE HYDROCHLORIDE AND DEXTROMETHORPHAN HYDROBROMIDE 6.25; 15 MG/5ML; MG/5ML
5 SYRUP ORAL NIGHTLY PRN
Qty: 50 ML | Refills: 0 | Status: SHIPPED | OUTPATIENT
Start: 2024-03-15 | End: 2024-03-22

## 2024-03-15 RX ORDER — AMOXICILLIN AND CLAVULANATE POTASSIUM 875; 125 MG/1; MG/1
1 TABLET, FILM COATED ORAL EVERY 12 HOURS
Qty: 14 TABLET | Refills: 0 | Status: SHIPPED | OUTPATIENT
Start: 2024-03-15 | End: 2024-03-22

## 2024-03-15 RX ORDER — AZELASTINE 1 MG/ML
1 SPRAY, METERED NASAL 2 TIMES DAILY PRN
Qty: 30 ML | Refills: 0 | Status: SHIPPED | OUTPATIENT
Start: 2024-03-15 | End: 2024-05-03

## 2024-03-15 RX ORDER — ALBUTEROL SULFATE 0.83 MG/ML
2.5 SOLUTION RESPIRATORY (INHALATION)
Status: COMPLETED | OUTPATIENT
Start: 2024-03-15 | End: 2024-03-15

## 2024-03-15 RX ORDER — GUAIFENESIN 200 MG/1
400 TABLET ORAL EVERY 4 HOURS PRN
Qty: 30 TABLET | Refills: 0 | Status: SHIPPED | OUTPATIENT
Start: 2024-03-15 | End: 2024-03-22

## 2024-03-15 RX ADMIN — IPRATROPIUM BROMIDE 0.5 MG: 0.5 SOLUTION RESPIRATORY (INHALATION) at 01:03

## 2024-03-15 RX ADMIN — ALBUTEROL SULFATE 2.5 MG: 0.83 SOLUTION RESPIRATORY (INHALATION) at 01:03

## 2024-03-15 NOTE — PATIENT INSTRUCTIONS
Increase clear fluid intake    Start Augmentin and doxycycline as soon as possible.  You may take Augmentin with food to limit GI upset.  Take each dose of doxycycline with a large glass of water and do not lie flat for 30 minutes afterwards.  Take both these courses until complete.    Stop all current over the counter cough, cold, flu medicine  Tylenol/motrin otc for fever or pain  Use Astelin nasal spray  for sinus congestion and pressure.    Take Mucinex   as needed for chest congestion and coughing. Take mucinex with a full glass of water at each dose  Tessalon Perles as needed for excessive coughing  May use promethazine DM at bedtime for excessive nighttime coughing  Add a humidifier to your room at bedtime for respiratory comfort.  Saltwater gargles 4 x daily and anesthetic throat lozenges for sore throat. May also add honey based cough syrup  Follow up with PCP  Go immediately to the nearest emergency room for shortness of breath, chest pain,  or other emergent concern.  Return to clinic for new, worse, or unresolving symptoms     An x-ray has been ordered for you today.  You may have this done in the outpatient service area of either local [facility].  Once the radiology report is received, someone from this office will contact you with the results.  Any changes in the plan of care will be discussed at that time.

## 2024-03-15 NOTE — PROGRESS NOTES
"Subjective:      Patient ID: Enrico Talbot is a 58 y.o. male.    Vitals:  height is 5' 11" (1.803 m) and weight is 109.8 kg (242 lb). His temperature is 98.1 °F (36.7 °C). His blood pressure is 153/84 (abnormal) and his pulse is 82. His respiration is 18 and oxygen saturation is 98%.     Chief Complaint: Cough    58-year-old male seen today for worsening cough over the last 7-10 days.  He was seen in this clinic and diagnosed with bronchitis 3 days ago.  He states he has been taking the medications he was prescribed.  He states he did take some guaifenesin but not routinely.  He states he felt as if he was getting better and then yesterday he states he began to feel worse and have a more violent cough.  He reports minimal production of phlegm.  He reports having to use a hair drier to blow cool air into his face last night to help with his coughing.  There has been no fever or shortness a breath.    Cough  This is a recurrent problem. The current episode started in the past 7 days. The cough is Non-productive. Associated symptoms include ear congestion, headaches, myalgias, nasal congestion and rhinorrhea. Pertinent negatives include no chest pain, chills, fever, rash, sore throat or shortness of breath. He has tried cool air, oral steroids and rest for the symptoms.       Constitution: Negative for chills and fever.   HENT:  Positive for congestion. Negative for sore throat.    Neck: Negative for painful lymph nodes.   Cardiovascular:  Negative for chest pain, palpitations and sob on exertion.   Respiratory:  Positive for cough. Negative for sputum production and shortness of breath.    Gastrointestinal:  Negative for nausea and vomiting.   Musculoskeletal:  Positive for muscle ache.   Skin:  Negative for rash.   Neurological:  Positive for headaches. Negative for dizziness, light-headedness, passing out, disorientation and altered mental status.   Hematologic/Lymphatic: Negative for swollen lymph nodes. "   Psychiatric/Behavioral:  Negative for altered mental status, disorientation and confusion.       Objective:     Physical Exam   Constitutional: He is oriented to person, place, and time. He appears well-developed. He is cooperative.  Non-toxic appearance. He does not appear ill. No distress.   HENT:   Head: Normocephalic and atraumatic.   Ears:   Right Ear: Hearing and external ear normal.   Left Ear: Hearing and external ear normal.   Nose: Congestion present. No mucosal edema, rhinorrhea or nasal deformity. No epistaxis. Right sinus exhibits no maxillary sinus tenderness and no frontal sinus tenderness. Left sinus exhibits no maxillary sinus tenderness and no frontal sinus tenderness.   Mouth/Throat: Uvula is midline, oropharynx is clear and moist and mucous membranes are normal. Mucous membranes are moist. No trismus in the jaw. Normal dentition. No uvula swelling. No oropharyngeal exudate, posterior oropharyngeal edema or posterior oropharyngeal erythema. Oropharynx is clear.   Eyes: Conjunctivae and lids are normal. No scleral icterus.   Neck: Trachea normal and phonation normal. Neck supple. No edema present. No erythema present. No neck rigidity present.   Cardiovascular: Normal rate, regular rhythm, normal heart sounds and normal pulses.   Pulmonary/Chest: Effort normal. No accessory muscle usage. No respiratory distress. He has decreased breath sounds. He has wheezes (Diffuse). He has rhonchi (Diffuse).   Abdominal: Normal appearance.   Musculoskeletal: Normal range of motion.         General: No deformity. Normal range of motion.   Neurological: no focal deficit. He is alert and oriented to person, place, and time. He exhibits normal muscle tone. Coordination normal.   Skin: Skin is warm, dry, intact, not diaphoretic and not pale. Capillary refill takes 2 to 3 seconds.   Psychiatric: His speech is normal and behavior is normal. Judgment and thought content normal.   Nursing note and vitals  reviewed.      Assessment:     1. Subacute cough    2. Acute pansinusitis, recurrence not specified    3. COVID-19 virus not detected        Plan:       Subacute cough  -     amoxicillin-clavulanate 875-125mg (AUGMENTIN) 875-125 mg per tablet; Take 1 tablet by mouth every 12 (twelve) hours. for 7 days  Dispense: 14 tablet; Refill: 0  -     methylPREDNISolone (MEDROL DOSEPACK) 4 mg tablet; use as directed  Dispense: 21 each; Refill: 0  -     guaiFENesin 200 mg tablet; Take 2 tablets (400 mg total) by mouth every 4 (four) hours as needed for Congestion.  Dispense: 30 tablet; Refill: 0  -     doxycycline (VIBRAMYCIN) 100 MG Cap; Take 1 capsule (100 mg total) by mouth every 12 (twelve) hours. for 7 days  Dispense: 14 capsule; Refill: 0  -     promethazine-dextromethorphan (PROMETHAZINE-DM) 6.25-15 mg/5 mL Syrp; Take 5 mLs by mouth nightly as needed (cough).  Dispense: 50 mL; Refill: 0  -     XR CHEST PA AND LATERAL; Future; Expected date: 03/15/2024  -     ipratropium 0.02 % nebulizer solution 0.5 mg  -     albuterol nebulizer solution 2.5 mg  -     POCT Influenza A/B  -     SARS Coronavirus 2 Antigen, POCT Manual Read    Acute pansinusitis, recurrence not specified  -     azelastine (ASTELIN) 137 mcg (0.1 %) nasal spray; 1 spray (137 mcg total) by Nasal route 2 (two) times daily as needed for Rhinitis.  Dispense: 30 mL; Refill: 0  -     POCT Influenza A/B  -     SARS Coronavirus 2 Antigen, POCT Manual Read    COVID-19 virus not detected    Flu negative      The test results and physical exam findings were discussed with the patient and all questions answered.  Patient reports feeling much better after nebulizer treatment.  Lung sounds improved.  Rhonchi resolved.  Some faint wheezes remain in bilateral lung bases.  Increased air flow auscultated.  We discussed symptom monitoring, conservative care methods, medication use, and follow up orders. he verbalized understanding and agreement with the plan of care.

## 2024-04-08 ENCOUNTER — TELEPHONE (OUTPATIENT)
Dept: FAMILY MEDICINE | Facility: CLINIC | Age: 59
End: 2024-04-08
Payer: COMMERCIAL

## 2024-04-08 DIAGNOSIS — Z00.00 ROUTINE GENERAL MEDICAL EXAMINATION AT A HEALTH CARE FACILITY: Primary | ICD-10-CM

## 2024-04-08 NOTE — TELEPHONE ENCOUNTER
Pt is establishing care and is scheduled for 5/3 at 7:30. He is requesting to have some blood work done. He is requesting Lipid panel, PSA, Testerone Free and Total and then any others needed.     Please advise

## 2024-04-09 NOTE — TELEPHONE ENCOUNTER
Please help this patient with lab work a week before his appt   (So lab appt is around early May).  Since it has testosterone labs, it has to be at 8 AM.    Inform him that I am getting few additional labs including hepatitis panel for his elevated liver enzymes and iron levels because of his previous lab work.    Thank you,  Olena Trinidad MD

## 2024-04-26 ENCOUNTER — LAB VISIT (OUTPATIENT)
Dept: LAB | Facility: HOSPITAL | Age: 59
End: 2024-04-26
Attending: STUDENT IN AN ORGANIZED HEALTH CARE EDUCATION/TRAINING PROGRAM
Payer: COMMERCIAL

## 2024-04-26 DIAGNOSIS — Z00.00 ROUTINE GENERAL MEDICAL EXAMINATION AT A HEALTH CARE FACILITY: ICD-10-CM

## 2024-04-26 LAB
ALBUMIN SERPL BCP-MCNC: 4.2 G/DL (ref 3.5–5.2)
ALP SERPL-CCNC: 81 U/L (ref 55–135)
ALT SERPL W/O P-5'-P-CCNC: 35 U/L (ref 10–44)
ANION GAP SERPL CALC-SCNC: 9 MMOL/L (ref 8–16)
AST SERPL-CCNC: 39 U/L (ref 10–40)
BASOPHILS # BLD AUTO: 0.04 K/UL (ref 0–0.2)
BASOPHILS NFR BLD: 0.7 % (ref 0–1.9)
BILIRUB SERPL-MCNC: 1.2 MG/DL (ref 0.1–1)
BUN SERPL-MCNC: 19 MG/DL (ref 6–20)
CALCIUM SERPL-MCNC: 9.5 MG/DL (ref 8.7–10.5)
CHLORIDE SERPL-SCNC: 106 MMOL/L (ref 95–110)
CHOLEST SERPL-MCNC: 95 MG/DL (ref 120–199)
CHOLEST/HDLC SERPL: 2.6 {RATIO} (ref 2–5)
CO2 SERPL-SCNC: 27 MMOL/L (ref 23–29)
COMPLEXED PSA SERPL-MCNC: 2.1 NG/ML (ref 0–4)
CREAT SERPL-MCNC: 1.3 MG/DL (ref 0.5–1.4)
DIFFERENTIAL METHOD BLD: ABNORMAL
EOSINOPHIL # BLD AUTO: 0.1 K/UL (ref 0–0.5)
EOSINOPHIL NFR BLD: 1.8 % (ref 0–8)
ERYTHROCYTE [DISTWIDTH] IN BLOOD BY AUTOMATED COUNT: 18.6 % (ref 11.5–14.5)
EST. GFR  (NO RACE VARIABLE): >60 ML/MIN/1.73 M^2
ESTIMATED AVG GLUCOSE: 103 MG/DL (ref 68–131)
FERRITIN SERPL-MCNC: 178 NG/ML (ref 20–300)
GLUCOSE SERPL-MCNC: 106 MG/DL (ref 70–110)
HAV IGM SERPL QL IA: NORMAL
HBA1C MFR BLD: 5.2 % (ref 4–5.6)
HBV CORE IGM SERPL QL IA: NORMAL
HBV SURFACE AG SERPL QL IA: NORMAL
HCT VFR BLD AUTO: 45.5 % (ref 40–54)
HCV AB SERPL QL IA: NORMAL
HDLC SERPL-MCNC: 36 MG/DL (ref 40–75)
HDLC SERPL: 37.9 % (ref 20–50)
HGB BLD-MCNC: 13.9 G/DL (ref 14–18)
IMM GRANULOCYTES # BLD AUTO: 0.02 K/UL (ref 0–0.04)
IMM GRANULOCYTES NFR BLD AUTO: 0.4 % (ref 0–0.5)
IRON SERPL-MCNC: 178 UG/DL (ref 45–160)
LDLC SERPL CALC-MCNC: 39.4 MG/DL (ref 63–159)
LYMPHOCYTES # BLD AUTO: 1.6 K/UL (ref 1–4.8)
LYMPHOCYTES NFR BLD: 29.5 % (ref 18–48)
MCH RBC QN AUTO: 21.2 PG (ref 27–31)
MCHC RBC AUTO-ENTMCNC: 30.5 G/DL (ref 32–36)
MCV RBC AUTO: 70 FL (ref 82–98)
MONOCYTES # BLD AUTO: 0.4 K/UL (ref 0.3–1)
MONOCYTES NFR BLD: 8.1 % (ref 4–15)
NEUTROPHILS # BLD AUTO: 3.2 K/UL (ref 1.8–7.7)
NEUTROPHILS NFR BLD: 59.5 % (ref 38–73)
NONHDLC SERPL-MCNC: 59 MG/DL
NRBC BLD-RTO: 0 /100 WBC
PLATELET # BLD AUTO: 157 K/UL (ref 150–450)
PMV BLD AUTO: 10.6 FL (ref 9.2–12.9)
POTASSIUM SERPL-SCNC: 4.6 MMOL/L (ref 3.5–5.1)
PROT SERPL-MCNC: 6.9 G/DL (ref 6–8.4)
RBC # BLD AUTO: 6.55 M/UL (ref 4.6–6.2)
SATURATED IRON: 49 % (ref 20–50)
SODIUM SERPL-SCNC: 142 MMOL/L (ref 136–145)
TESTOST SERPL-MCNC: 431 NG/DL (ref 304–1227)
TOTAL IRON BINDING CAPACITY: 367 UG/DL (ref 250–450)
TRANSFERRIN SERPL-MCNC: 248 MG/DL (ref 200–375)
TRIGL SERPL-MCNC: 98 MG/DL (ref 30–150)
WBC # BLD AUTO: 5.45 K/UL (ref 3.9–12.7)

## 2024-04-26 PROCEDURE — 80053 COMPREHEN METABOLIC PANEL: CPT | Performed by: STUDENT IN AN ORGANIZED HEALTH CARE EDUCATION/TRAINING PROGRAM

## 2024-04-26 PROCEDURE — 84403 ASSAY OF TOTAL TESTOSTERONE: CPT | Performed by: STUDENT IN AN ORGANIZED HEALTH CARE EDUCATION/TRAINING PROGRAM

## 2024-04-26 PROCEDURE — 83540 ASSAY OF IRON: CPT | Performed by: STUDENT IN AN ORGANIZED HEALTH CARE EDUCATION/TRAINING PROGRAM

## 2024-04-26 PROCEDURE — 80074 ACUTE HEPATITIS PANEL: CPT | Performed by: STUDENT IN AN ORGANIZED HEALTH CARE EDUCATION/TRAINING PROGRAM

## 2024-04-26 PROCEDURE — 83036 HEMOGLOBIN GLYCOSYLATED A1C: CPT | Performed by: STUDENT IN AN ORGANIZED HEALTH CARE EDUCATION/TRAINING PROGRAM

## 2024-04-26 PROCEDURE — 85025 COMPLETE CBC W/AUTO DIFF WBC: CPT | Performed by: STUDENT IN AN ORGANIZED HEALTH CARE EDUCATION/TRAINING PROGRAM

## 2024-04-26 PROCEDURE — 82728 ASSAY OF FERRITIN: CPT | Performed by: STUDENT IN AN ORGANIZED HEALTH CARE EDUCATION/TRAINING PROGRAM

## 2024-04-26 PROCEDURE — 84153 ASSAY OF PSA TOTAL: CPT | Performed by: STUDENT IN AN ORGANIZED HEALTH CARE EDUCATION/TRAINING PROGRAM

## 2024-04-26 PROCEDURE — 84402 ASSAY OF FREE TESTOSTERONE: CPT | Performed by: STUDENT IN AN ORGANIZED HEALTH CARE EDUCATION/TRAINING PROGRAM

## 2024-04-26 PROCEDURE — 80061 LIPID PANEL: CPT | Performed by: STUDENT IN AN ORGANIZED HEALTH CARE EDUCATION/TRAINING PROGRAM

## 2024-05-02 LAB — TESTOST FREE SERPL-MCNC: 10.8 PG/ML

## 2024-05-02 NOTE — PROGRESS NOTES
"[facility]   OFFICE VISIT NOTE    Patient Name: Enrico Talbot  YOB: 1965    PRESENTING HISTORY     History of Present Illness:  Mr. Enrico Talbot is a 59 y.o. male here to establish care   Patient is notable for hyperlipidemia, NSTEMI, status post coronary artery stent placement, thalassemia minor, low testosterone, elevated BMI    Medications including albuterol, aspirin, lisinopril 2.5 mg daily, metoprolol succinate 25 mg daily, rosuvastatin 10 mg daily  Not taking lisinopril 2.5 mg daily     Patient follows up with Dermatology, Cardiology  [facility] 20[facility]  He was on it since 2019 to stent to LCX.  Advised to continue with aspirin, statin.  Follow up every 3-4 months.  Strict diet and exercise recommended    Surgeries:  - coronary angiography, left heart cath     Family history:  - father with heart disease and heart failure ()  - mother with dementia    Thalassemia minor  Low MCV, high iron   A1c nml  CMP and CBC wnl   Total testosterone wnl       , 4 kids  On weight loss journey -- work out 4 times a week. Does not eat fried food, pork, or red meat       Review of Systems   Constitutional:  Negative for chills, diaphoresis, fatigue, fever and unexpected weight change.   Respiratory:  Negative for cough, shortness of breath and wheezing.    Cardiovascular:  Negative for chest pain and palpitations.   Gastrointestinal:  Negative for constipation, diarrhea, nausea and vomiting.   Genitourinary:  Negative for bladder incontinence.   Neurological:  Negative for coordination difficulties.   Psychiatric/Behavioral:  Negative for behavioral problems.           OBJECTIVE:   Vital Signs:  Vitals:    24 0726   BP: 124/82   Pulse: (!) 58   Resp: 18   SpO2: 99%   Weight: 113.4 kg (250 lb)   Height: 5' 11" (1.803 m)           Physical Exam  Constitutional:       General: He is not in acute " distress.     Appearance: He is obese. He is not ill-appearing or toxic-appearing.   HENT:      Head: Normocephalic and atraumatic.      Mouth/Throat:      Mouth: Mucous membranes are moist.      Pharynx: Uvula midline. No pharyngeal swelling.   Cardiovascular:      Rate and Rhythm: Normal rate and regular rhythm.   Pulmonary:      Effort: Pulmonary effort is normal. No tachypnea, bradypnea, accessory muscle usage, prolonged expiration or respiratory distress.      Breath sounds: Normal breath sounds. No stridor. No wheezing, rhonchi or rales.   Abdominal:      General: Abdomen is flat. Bowel sounds are normal.      Palpations: Abdomen is soft.      Tenderness: There is no abdominal tenderness. There is no guarding or rebound.   Neurological:      General: No focal deficit present.      Mental Status: He is alert.   Psychiatric:         Mood and Affect: Mood normal.         Behavior: Behavior normal.         ASSESSMENT & PLAN:     Routine general medical examination at a health care facility  Labs already completed -- wnl   Eye exam is due and patient will make an appointment   Colonoscopy due in 2025    S/P coronary artery stent placement  Continue to follow up with cardiology   Continue with metoprolol   Patient still takes aspirin   He is now eating healthier -- no fried food or red meat  Mostly drinks water    Mixed hyperlipidemia  Continue with rosuvastatin     Thalassemia minor  Labs look stable  Continue to monitor     Follow up in 1 year for another annual exam or sooner if indicated        Olena Trinidad MD  Family Medicine  [facility]     This note was created using [email or web address] voice recognition software that occasionally misinterprets phrases or words

## 2024-05-03 ENCOUNTER — OFFICE VISIT (OUTPATIENT)
Dept: FAMILY MEDICINE | Facility: CLINIC | Age: 59
End: 2024-05-03
Payer: COMMERCIAL

## 2024-05-03 VITALS
RESPIRATION RATE: 18 BRPM | OXYGEN SATURATION: 99 % | HEART RATE: 58 BPM | WEIGHT: 250 LBS | DIASTOLIC BLOOD PRESSURE: 82 MMHG | SYSTOLIC BLOOD PRESSURE: 124 MMHG | HEIGHT: 71 IN | BODY MASS INDEX: 35 KG/M2

## 2024-05-03 DIAGNOSIS — Z00.00 ROUTINE GENERAL MEDICAL EXAMINATION AT A HEALTH CARE FACILITY: Primary | ICD-10-CM

## 2024-05-03 DIAGNOSIS — E78.2 MIXED HYPERLIPIDEMIA: ICD-10-CM

## 2024-05-03 DIAGNOSIS — Z95.5 S/P CORONARY ARTERY STENT PLACEMENT: ICD-10-CM

## 2024-05-03 DIAGNOSIS — D56.3 THALASSEMIA MINOR: ICD-10-CM

## 2024-05-03 PROCEDURE — 3008F BODY MASS INDEX DOCD: CPT | Mod: CPTII,S$GLB,, | Performed by: STUDENT IN AN ORGANIZED HEALTH CARE EDUCATION/TRAINING PROGRAM

## 2024-05-03 PROCEDURE — 3079F DIAST BP 80-89 MM HG: CPT | Mod: CPTII,S$GLB,, | Performed by: STUDENT IN AN ORGANIZED HEALTH CARE EDUCATION/TRAINING PROGRAM

## 2024-05-03 PROCEDURE — 3074F SYST BP LT 130 MM HG: CPT | Mod: CPTII,S$GLB,, | Performed by: STUDENT IN AN ORGANIZED HEALTH CARE EDUCATION/TRAINING PROGRAM

## 2024-05-03 PROCEDURE — 99396 PREV VISIT EST AGE 40-64: CPT | Mod: S$GLB,,, | Performed by: STUDENT IN AN ORGANIZED HEALTH CARE EDUCATION/TRAINING PROGRAM

## 2024-05-03 PROCEDURE — 99999 PR PBB SHADOW E&M-EST. PATIENT-LVL III: CPT | Mod: PBBFAC,,, | Performed by: STUDENT IN AN ORGANIZED HEALTH CARE EDUCATION/TRAINING PROGRAM

## 2024-05-03 PROCEDURE — 3044F HG A1C LEVEL LT 7.0%: CPT | Mod: CPTII,S$GLB,, | Performed by: STUDENT IN AN ORGANIZED HEALTH CARE EDUCATION/TRAINING PROGRAM

## 2024-05-03 PROCEDURE — 4010F ACE/ARB THERAPY RXD/TAKEN: CPT | Mod: CPTII,S$GLB,, | Performed by: STUDENT IN AN ORGANIZED HEALTH CARE EDUCATION/TRAINING PROGRAM

## 2024-05-10 RX ORDER — METOPROLOL SUCCINATE 25 MG/1
25 TABLET, EXTENDED RELEASE ORAL
Qty: 90 TABLET | Refills: 3 | Status: SHIPPED | OUTPATIENT
Start: 2024-05-10

## 2024-05-10 RX ORDER — LISINOPRIL 2.5 MG/1
2.5 TABLET ORAL 2 TIMES DAILY
Qty: 180 TABLET | Refills: 0 | Status: SHIPPED | OUTPATIENT
Start: 2024-05-10

## 2024-08-05 RX ORDER — LISINOPRIL 2.5 MG/1
2.5 TABLET ORAL 2 TIMES DAILY
Qty: 180 TABLET | Refills: 3 | Status: SHIPPED | OUTPATIENT
Start: 2024-08-05 | End: 2024-08-07

## 2024-08-05 RX ORDER — METOPROLOL SUCCINATE 25 MG/1
25 TABLET, EXTENDED RELEASE ORAL DAILY
Qty: 90 TABLET | Refills: 3 | Status: SHIPPED | OUTPATIENT
Start: 2024-08-05 | End: 2025-08-05

## 2024-08-06 ENCOUNTER — TELEPHONE (OUTPATIENT)
Dept: CARDIOLOGY | Facility: CLINIC | Age: 59
End: 2024-08-06
Payer: COMMERCIAL

## 2024-08-07 RX ORDER — LISINOPRIL 2.5 MG/1
2.5 TABLET ORAL 2 TIMES DAILY
Qty: 180 TABLET | Refills: 3 | Status: SHIPPED | OUTPATIENT
Start: 2024-08-07

## 2025-01-15 ENCOUNTER — TELEPHONE (OUTPATIENT)
Dept: FAMILY MEDICINE | Facility: CLINIC | Age: 60
End: 2025-01-15
Payer: COMMERCIAL

## 2025-01-15 DIAGNOSIS — D56.3 THALASSEMIA MINOR: ICD-10-CM

## 2025-01-15 DIAGNOSIS — E78.2 MIXED HYPERLIPIDEMIA: ICD-10-CM

## 2025-01-15 DIAGNOSIS — Z12.5 ENCOUNTER FOR PROSTATE CANCER SCREENING: ICD-10-CM

## 2025-01-15 DIAGNOSIS — Z00.00 ROUTINE GENERAL MEDICAL EXAMINATION AT A HEALTH CARE FACILITY: Primary | ICD-10-CM

## 2025-01-15 NOTE — TELEPHONE ENCOUNTER
----- Message from Med Assistant Jaky sent at 1/15/2025 10:03 AM CST -----  Regarding: LAB ORDERS  Pt is scheduled for annual and want to have labs before appt  please advise pt when orders are placed   thanks

## 2025-01-15 NOTE — TELEPHONE ENCOUNTER
Patient have an Annual scheduled 05/08/25 and would like to have labs scheduled before appointment. Please advise

## 2025-04-08 ENCOUNTER — OFFICE VISIT (OUTPATIENT)
Dept: OTOLARYNGOLOGY | Facility: CLINIC | Age: 60
End: 2025-04-08
Payer: COMMERCIAL

## 2025-04-08 VITALS — WEIGHT: 251.56 LBS | HEIGHT: 71 IN | BODY MASS INDEX: 35.22 KG/M2

## 2025-04-08 DIAGNOSIS — T70.0XXA OTITIC BAROTRAUMA, INITIAL ENCOUNTER: Primary | ICD-10-CM

## 2025-04-08 DIAGNOSIS — H66.001 NON-RECURRENT ACUTE SUPPURATIVE OTITIS MEDIA OF RIGHT EAR WITHOUT SPONTANEOUS RUPTURE OF TYMPANIC MEMBRANE: ICD-10-CM

## 2025-04-08 DIAGNOSIS — H69.93 DYSFUNCTION OF BOTH EUSTACHIAN TUBES: ICD-10-CM

## 2025-04-08 DIAGNOSIS — H65.02 NON-RECURRENT ACUTE SEROUS OTITIS MEDIA OF LEFT EAR: ICD-10-CM

## 2025-04-08 DIAGNOSIS — J01.00 ACUTE MAXILLARY SINUSITIS, RECURRENCE NOT SPECIFIED: ICD-10-CM

## 2025-04-08 PROCEDURE — 1159F MED LIST DOCD IN RCRD: CPT | Mod: CPTII,S$GLB,, | Performed by: PHYSICIAN ASSISTANT

## 2025-04-08 PROCEDURE — 99999 PR PBB SHADOW E&M-EST. PATIENT-LVL III: CPT | Mod: PBBFAC,,, | Performed by: PHYSICIAN ASSISTANT

## 2025-04-08 PROCEDURE — 3008F BODY MASS INDEX DOCD: CPT | Mod: CPTII,S$GLB,, | Performed by: PHYSICIAN ASSISTANT

## 2025-04-08 PROCEDURE — 4010F ACE/ARB THERAPY RXD/TAKEN: CPT | Mod: CPTII,S$GLB,, | Performed by: PHYSICIAN ASSISTANT

## 2025-04-08 PROCEDURE — 1160F RVW MEDS BY RX/DR IN RCRD: CPT | Mod: CPTII,S$GLB,, | Performed by: PHYSICIAN ASSISTANT

## 2025-04-08 PROCEDURE — 99203 OFFICE O/P NEW LOW 30 MIN: CPT | Mod: S$GLB,,, | Performed by: PHYSICIAN ASSISTANT

## 2025-04-08 RX ORDER — AZELASTINE 1 MG/ML
1 SPRAY, METERED NASAL 2 TIMES DAILY
Qty: 30 ML | Refills: 2 | Status: SHIPPED | OUTPATIENT
Start: 2025-04-08 | End: 2026-04-08

## 2025-04-08 RX ORDER — PREDNISONE 20 MG/1
40 TABLET ORAL DAILY
Qty: 10 TABLET | Refills: 0 | Status: SHIPPED | OUTPATIENT
Start: 2025-04-08 | End: 2025-04-13

## 2025-04-08 RX ORDER — FLUTICASONE PROPIONATE 50 MCG
1 SPRAY, SUSPENSION (ML) NASAL 2 TIMES DAILY
Qty: 16 G | Refills: 2 | Status: SHIPPED | OUTPATIENT
Start: 2025-04-08

## 2025-04-08 RX ORDER — AMOXICILLIN AND CLAVULANATE POTASSIUM 875; 125 MG/1; MG/1
1 TABLET, FILM COATED ORAL EVERY 12 HOURS
Qty: 20 TABLET | Refills: 0 | Status: SHIPPED | OUTPATIENT
Start: 2025-04-08 | End: 2025-04-18

## 2025-04-08 NOTE — PATIENT INSTRUCTIONS
Use flonase 1 spray (50mcg total) by Each Nostril route 2 (two) times daily and astelin 1 spray (137mcg total) by Nasal route (two) times daily. Point up and slightly outward toward ear when using medicated nasal sprays as to avoid irritating nasal septum.     Use NeilMed sinus rinse two times daily. This can be bought over the counter. Make sure to use distilled water when using sinus irrigations. Use salt packs and mix in solution. Use Neilmed sinus rinses twice daily (morning and evening) followed by flonase 1 spray (50mcg total) by Each Nostril route 2 (two) times daily and astelin 1 spray (137mcg total) by Nasal route (two) times daily. Point up and slightly outward toward ear when using medicated nasal sprays as to avoid irritating nasal septum. Use sinus rinse followed by flonase and and astelin for 2 weeks.           DIRECTIONS FOR SINUS SALINE RINSE To see demonstration: Enter [email or web address]/[email or web address] into the browser address box, or go to You tube, and under the search box, enter sinus rinse. Click on NeilMed Sinus Rinse Video    Step 1    Step 1 Please wash your hands. Fill the clean bottle with the designated volume of warm distilled water, filtered water or previously boiled water. You may warm the water in a microwave but we recommend that you warm it in increments of five seconds. This is to avoid excessive heating of the water and damage to the device or scalding your nasal passage.    Step 2    Step 2 Cut the SINUS RINSE mixture packet at the corner and pour its contents into the bottle. Tighten the cap & tube on the bottle securely, place one finger over the tip of the cap and shake the bottle gently to dissolve the mixture.      Step 3    Step 3 Standing in front of a sink, bend forward to your comfort level and tilt your head down. Keeping your mouth open without holding your breath, place cap snugly against your nasal passage and SQUEEZE BOTTLE GENTLY until the solution  starts draining from the OPPOSITE nasal passage or from your mouth. Keep squeezing the bottle GENTLY until at least 1/4 to 1/2 (60 to 120 mL) of the bottle is used for a proper rinse. Do not swallow the solution.    Step 4    Blow your nose gently, without pinching your nose completely because this will apply pressure on the eardrums. If tolerable, sniff in any residual solution remaining in the nasal passage once or twice prior to blowing your nose as this may clean out the posterior nasopharyngeal area (the area at the back of your nasal passage). Some solution will reach the back of the throat, so please spit it out. To help improve drainage of any residual solution, blow your nose gently while tilting your head to the opposite side of the nasal passage that you just rinsed.    Step 5    Now repeat steps 3 & 4 for your other nasal passage.    Step 6     Air dry the Sinus Rinse bottle, cap, and tube on a clean paper towel, a glass plate to store the bottle cap and tube. If there is any solution leftover, please discard it. We recommend you make a fresh solution each time you rinse. Rinse 5 times each day, OR as directed by your physician. Warnings: DO NOT RINSE IF NASAL PASSAGE IS COMPLETELY BLOCKED OR IF YOU HAVE AN EAR INFECTION OR BLOCKED EARS. If you have had ear surgery, please contact your physician prior to irrigation. If you experience any pressure in your ears, stop the rinse and get further directions from your physician or contact our office during regular business hours. To avoid any ear discomfort: Heat the solution to lukewarm, do not use hot, boiling or cold water. Keep your mouth open. Do not hold your breath and if possible make the sound PERFECTO....PERFECTO... Make sure to take the position as shown. Gently squeeze 1/4 of the bottle at a time (60mL / 2 ounces). Stop the rinse if you feel any solution sensation near the ears. You may rinse with a partially blocked nasal passage. Please do not use for any  other purposes. Please rinse at least ONE HOUR PRIOR to bedtime, in order to avoid any residual solution dripping in the throat.    >> Before using the SINUS RINSE kit, please inspect the cap, tube and bottle carefully for wear and tear. If any of the components appear discolored or cracked, please contact [email or web address] to obtain a replacement. You must follow the cleaning instructions provided in this brochure or cleaning instruction card prior to each use.    >> The SINUS RINSE bottle and mixture are to be used only for nasalirrigation. Do not use for any other purposes.    >> We recommend that you use the rinse ONE HOUR PRIOR to bedtime in order to avoid any residual solution dripping in the throat.    Tips to avoid ear discomfort while rinsing    If you have had ear surgery, please contact your physician prior to irrigation. Do not use if you have an ear infection or blocked ears. Rinse with lukewarm water. Keep your mouth open. Do not hold your breath while rinsing. While rinsing, make sure to tilt your. Gently squeeze the bottle while rinsing; do not squeeze the bottle very forcefully. Stop the rinse if you feel a sensation of fluid near your ears.    Tips to avoid unexpected drainage after rinsing    In rare situations, especially if you have had sinus surgery, the saline solution can pool in the sinus cavities and nasal passages and then drip from your nostrils hours after rinsing. To avoid this harmless but annoying inconvenience, take one extra step after rinsing: lean forward, tilt your head sideways and gently blow your nose. Then, tilt your head to the other side and blow again. You may need to repeat this several times. This will help rid your nasal passages of any excess mucus and remaining saline solution. If you find yourself experiencing delayed drainage often, do not rinse right before leaving your house or going to bed.

## 2025-04-08 NOTE — PROGRESS NOTES
[facility]    Subjective:      Patient: Enrico Talbot Patient PCP: Olena Trinidad MD         :  1965     Sex:  male      MRN:  [id number]          Date of Visit: 2025      Chief Complaint: Acute maxillary sinusitis/right suppurative OM/left serous OM    Patient ID: Enrico Talbot is a 59 y.o. male who presents to office for evaluation of ear pain and sinus issues. Pt has h/o sinus surgery in . Pt states that he felt as though he was having a sinus infection over this past weekend. He took a plane flight to [place], [place] and had severe ear pain during his flight. He was then at a wedding when a cold-front came in. He states that he has had right-sided ear pain. He has also had right greater than left aural fullness. Pt states that he has had pressure in his right cheek and pain down his teeth on the right side. Pt endorses purulent nasal drainage.       Past Medical History  He has a past medical history of Anemia, AR (allergic rhinitis), Hyperlipidemia, and Hypertension.    Family History  His family history includes Dementia in his mother; Heart disease in his father; Heart failure in his father.    Past Surgical History:   Procedure Laterality Date    CORONARY ANGIOGRAPHY Right 10/21/2019    Procedure: Left heart cath and possible PCI;  Surgeon: Scooby Mejia MD;  Location: [facility] CATH/EP LAB;  Service: Cardiology;  Laterality: Right;    EYE SURGERY      Lasik    LEFT HEART CATHETERIZATION Left 10/21/2019    Procedure: Left heart cath;  Surgeon: Scooby Mejia MD;  Location: [facility] CATH/EP LAB;  Service: Cardiology;  Laterality: Left;    REFRACTIVE SURGERY  1998    SINUS SURGERY  1995     Social History     Tobacco Use    Smoking status: Never     Passive exposure: Never    Smokeless tobacco: Never   Substance and Sexual Activity    Alcohol use: No     Comment: QUIT     Drug use: No    Sexual activity: Yes     Partners: Female     Birth control/protection: None  "    Medications  He has a current medication list which includes the following prescription(s): aspirin, metoprolol succinate, mv-mn/folic acid/lutein/mqc731, rosuvastatin, albuterol, amoxicillin-clavulanate 875-125mg, azelastine, fluticasone propionate, lisinopril, and prednisone.    Review of patient's allergies indicates:   Allergen Reactions    Lactose intolerance [lactase]      All medications, allergies, and past history have been reviewed.    Objective:      Vitals:      3/15/2024    12:36 PM 5/3/2024     7:26 AM 4/8/2025     1:12 PM   Vitals - 1 value per visit   SYSTOLIC 153 124    DIASTOLIC 84 82    Pulse 82 58    Temp 98.1 °F (36.7 °C)     Resp 18 18    SPO2 98 % 99 %    Weight (lb) 242 250 251.55   Weight (kg) 109.77 113.4 114.1   Height 5' 11" (1.803 m) 5' 11" (1.803 m) 5' 11" (1.803 m)   BMI (Calculated) 33.8 34.9 35.1   Pain Score Zero Zero Five       Body surface area is 2.39 meters squared.    Physical Exam  Constitutional:       General: He is not in acute distress.     Appearance: Normal appearance. He is not ill-appearing.   HENT:      Head: Normocephalic and atraumatic.      Right Ear: Ear canal and external ear normal. A middle ear effusion (suppurative) is present. Tympanic membrane is erythematous. Tympanic membrane is not scarred, perforated or retracted.      Left Ear: Ear canal and external ear normal. A middle ear effusion (serous) is present. Tympanic membrane is retracted. Tympanic membrane is not scarred, perforated, erythematous or bulging.      Nose: Septal deviation (rightward) and rhinorrhea (right nares) present. Rhinorrhea is purulent.      Mouth/Throat:      Lips: Pink. No lesions.      Mouth: Mucous membranes are moist. No oral lesions.      Tongue: No lesions.      Palate: No lesions.      Pharynx: Oropharynx is clear. Uvula midline. No pharyngeal swelling, oropharyngeal exudate, posterior oropharyngeal erythema or uvula swelling.   Eyes:      General:         Right eye: No " discharge.         Left eye: No discharge.      Extraocular Movements: Extraocular movements intact.      Conjunctiva/sclera: Conjunctivae normal.   Pulmonary:      Effort: Pulmonary effort is normal.   Neurological:      General: No focal deficit present.      Mental Status: He is alert and oriented to person, place, and time. Mental status is at baseline.   Psychiatric:         Mood and Affect: Mood normal.         Behavior: Behavior normal.         Thought Content: Thought content normal.         Judgment: Judgment normal.         Labs:  WBC   Date Value Ref Range Status   04/26/2024 5.45 3.90 - 12.70 K/uL Final     Platelets   Date Value Ref Range Status   04/26/2024 157 150 - 450 K/uL Final     Creatinine   Date Value Ref Range Status   04/26/2024 1.3 0.5 - 1.4 mg/dL Final     TSH   Date Value Ref Range Status   10/22/2019 1.340 0.340 - 5.600 uIU/mL Final     Glucose   Date Value Ref Range Status   04/26/2024 106 70 - 110 mg/dL Final     Hemoglobin A1C   Date Value Ref Range Status   04/26/2024 5.2 4.0 - 5.6 % Final     Comment:     ADA Screening Guidelines:  5.7-6.4%  Consistent with prediabetes  >or=6.5%  Consistent with diabetes    High levels of fetal hemoglobin interfere with the HbA1C  assay. Heterozygous hemoglobin variants (HbS, HgC, etc)do  not significantly interfere with this assay.   However, presence of multiple variants may affect accuracy.         Audiogram Summary:None at present.  All lab results and imaging results have been reviewed.    Assessment:        ICD-10-CM ICD-9-CM   1. Otitic barotrauma, initial encounter  T70.0XXA 993.0     E902.9   2. Dysfunction of both eustachian tubes  H69.93 381.81   3. Non-recurrent acute suppurative otitis media of right ear without spontaneous rupture of tympanic membrane  H66.001 382.00   4. Non-recurrent acute serous otitis media of left ear  H65.02 381.01   5. Acute maxillary sinusitis, recurrence not specified  J01.00 461.0            Plan:      Otitic  barotrauma, initial encounter  -     fluticasone propionate (FLONASE) 50 mcg/actuation nasal spray; 1 spray (50 mcg total) by Each Nostril route 2 (two) times daily. Point up and slightly outward toward ear when spraying to avoid irritating nasal septum.  Dispense: 16 g; Refill: 2  -     azelastine (ASTELIN) 137 mcg (0.1 %) nasal spray; 1 spray (137 mcg total) by Nasal route 2 (two) times daily. Point up and slightly outward toward ear when spraying to avoid irritating nasal septum.  Dispense: 30 mL; Refill: 2    Dysfunction of both eustachian tubes  -     fluticasone propionate (FLONASE) 50 mcg/actuation nasal spray; 1 spray (50 mcg total) by Each Nostril route 2 (two) times daily. Point up and slightly outward toward ear when spraying to avoid irritating nasal septum.  Dispense: 16 g; Refill: 2  -     azelastine (ASTELIN) 137 mcg (0.1 %) nasal spray; 1 spray (137 mcg total) by Nasal route 2 (two) times daily. Point up and slightly outward toward ear when spraying to avoid irritating nasal septum.  Dispense: 30 mL; Refill: 2    Non-recurrent acute suppurative otitis media of right ear without spontaneous rupture of tympanic membrane  -     amoxicillin-clavulanate 875-125mg (AUGMENTIN) 875-125 mg per tablet; Take 1 tablet by mouth every 12 (twelve) hours. for 10 days  Dispense: 20 tablet; Refill: 0  -     predniSONE (DELTASONE) 20 MG tablet; Take 2 tablets (40 mg total) by mouth once daily. for 5 days  Dispense: 10 tablet; Refill: 0    Non-recurrent acute serous otitis media of left ear  -     amoxicillin-clavulanate 875-125mg (AUGMENTIN) 875-125 mg per tablet; Take 1 tablet by mouth every 12 (twelve) hours. for 10 days  Dispense: 20 tablet; Refill: 0  -     predniSONE (DELTASONE) 20 MG tablet; Take 2 tablets (40 mg total) by mouth once daily. for 5 days  Dispense: 10 tablet; Refill: 0    Acute maxillary sinusitis, recurrence not specified  -     amoxicillin-clavulanate 875-125mg (AUGMENTIN) 875-125 mg per tablet;  Take 1 tablet by mouth every 12 (twelve) hours. for 10 days  Dispense: 20 tablet; Refill: 0  -     predniSONE (DELTASONE) 20 MG tablet; Take 2 tablets (40 mg total) by mouth once daily. for 5 days  Dispense: 10 tablet; Refill: 0       Use NeilMed sinus rinse two times daily. This can be bought over the counter. Make sure to use distilled water when using sinus irrigations. Use salt packs and mix in solution. Use Neilmed sinus rinses twice daily (morning and evening) followed by flonase 1 spray (50mcg total) by Each Nostril route 2 (two) times daily and astelin 1 spray (137mcg total) by Nasal route (two) times daily. Point up and slightly outward toward ear when using medicated nasal sprays as to avoid irritating nasal septum. Use sinus rinse followed by flonase and and astelin for 2 weeks.

## 2025-04-09 ENCOUNTER — TELEPHONE (OUTPATIENT)
Dept: PULMONOLOGY | Facility: CLINIC | Age: 60
End: 2025-04-09
Payer: COMMERCIAL

## 2025-04-09 NOTE — TELEPHONE ENCOUNTER
Spoke to patient, he needed a pressure change recommend he call [facility] pt     ----- Message from Rachel sent at 4/9/2025  9:36 AM CDT -----  Type: General Call Back Name of Caller:Pt Symptoms: sleep apnea Would the patient rather a call back or a response via [facility]? callBest Call Back Number:[phone] Additional Information: Pt called to [facility] Care he's scheduled to come in on July 3rd at 1:00. Pt has a few questions can someone please contact  pt with  further information.  ----- Message -----  From: Rachel Gutiérrez  Sent: 4/9/2025   9:39 AM CDT  To: [facility] Staff    Type: General Call Back Name of Caller:Pt Symptoms: sleep apnea Would the patient rather a call back or a response via [facility]? callBest Call Back Number:[phone] Additional Information: Pt called to [facility] Care he's scheduled to come in on July 3rd at 1:00. Pt has a few questions can someone please contact  pt with  further information.

## 2025-04-24 ENCOUNTER — OFFICE VISIT (OUTPATIENT)
Dept: OTOLARYNGOLOGY | Facility: CLINIC | Age: 60
End: 2025-04-24
Payer: COMMERCIAL

## 2025-04-24 VITALS — BODY MASS INDEX: 35.12 KG/M2 | HEIGHT: 71 IN | WEIGHT: 250.88 LBS

## 2025-04-24 DIAGNOSIS — J32.9 SINUSITIS, UNSPECIFIED CHRONICITY, UNSPECIFIED LOCATION: ICD-10-CM

## 2025-04-24 DIAGNOSIS — T70.0XXA OTITIC BAROTRAUMA, INITIAL ENCOUNTER: Primary | ICD-10-CM

## 2025-04-24 DIAGNOSIS — G47.33 OSA (OBSTRUCTIVE SLEEP APNEA): ICD-10-CM

## 2025-04-24 DIAGNOSIS — H91.93 BILATERAL HEARING LOSS, UNSPECIFIED HEARING LOSS TYPE: ICD-10-CM

## 2025-04-24 PROCEDURE — 4010F ACE/ARB THERAPY RXD/TAKEN: CPT | Mod: CPTII,S$GLB,, | Performed by: PHYSICIAN ASSISTANT

## 2025-04-24 PROCEDURE — 99999 PR PBB SHADOW E&M-EST. PATIENT-LVL III: CPT | Mod: PBBFAC,,, | Performed by: PHYSICIAN ASSISTANT

## 2025-04-24 PROCEDURE — 99214 OFFICE O/P EST MOD 30 MIN: CPT | Mod: S$GLB,,, | Performed by: PHYSICIAN ASSISTANT

## 2025-04-24 PROCEDURE — 1159F MED LIST DOCD IN RCRD: CPT | Mod: CPTII,S$GLB,, | Performed by: PHYSICIAN ASSISTANT

## 2025-04-24 PROCEDURE — 3008F BODY MASS INDEX DOCD: CPT | Mod: CPTII,S$GLB,, | Performed by: PHYSICIAN ASSISTANT

## 2025-04-24 PROCEDURE — 1160F RVW MEDS BY RX/DR IN RCRD: CPT | Mod: CPTII,S$GLB,, | Performed by: PHYSICIAN ASSISTANT

## 2025-04-24 NOTE — PROGRESS NOTES
[facility]    Subjective:      Patient: Enrico Talbot Patient PCP: Olena Trinidad MD         :  1965     Sex:  male      MRN:  [id number]          Date of Visit: 2025      Chief Complaint: Acute maxillary sinusitis/right suppurative OM/left serous OM f/u    Patient ID: Enrico Talbot is a 60 y.o. male with PMHx of chronic sinusitis (s/p FESS in ) who presents to clinic for follow up acute maxillary sinusitis and bilateral OM secondary to barotrauma. Pt had right suppurative OM and left serous OM at last OV. Pt treated at last OV with Augmentin 875mg BID x 10 days, prednisone 40mg PO once daily x 5 days and advised to use neilmed sinus rinse BID followed by flonase and astelin 1 SEN BID. Pt states that his sinuses and ears feel much better. He has had clear runny nose with sneezing secondary to allergies. He denies purulent nasal drainage. Pt denies ear pain/discharge or aural fullness. He states that feels as though his CPAP pressure is low. He had last CPAP titration study in . He is planned to see pulmonology this summer. He does have h/o bilateral high-frequency SNHL. He has not had updated audiogram in the past 12 months.     Past Medical History  He has a past medical history of Anemia, AR (allergic rhinitis), Hyperlipidemia, and Hypertension.    Family History  His family history includes Dementia in his mother; Heart disease in his father; Heart failure in his father.    Past Surgical History:   Procedure Laterality Date    CORONARY ANGIOGRAPHY Right 10/21/2019    Procedure: Left heart cath and possible PCI;  Surgeon: Scooby Mejia MD;  Location: [facility] CATH/EP LAB;  Service: Cardiology;  Laterality: Right;    EYE SURGERY  1988    Lasik    LEFT HEART CATHETERIZATION Left 10/21/2019    Procedure: Left heart cath;  Surgeon: Scooby Mejia MD;  Location: [facility] CATH/EP LAB;  Service: Cardiology;  Laterality: Left;    REFRACTIVE SURGERY  1998    SINUS SURGERY   "01/01/1995     Social History     Tobacco Use    Smoking status: Never     Passive exposure: Never    Smokeless tobacco: Never   Substance and Sexual Activity    Alcohol use: No     Comment: QUIT 1985    Drug use: No    Sexual activity: Yes     Partners: Female     Birth control/protection: None     Medications  He has a current medication list which includes the following prescription(s): aspirin, azelastine, fluticasone propionate, lisinopril, metoprolol succinate, mv-mn/folic acid/lutein/uvy485, rosuvastatin, and albuterol.    Review of patient's allergies indicates:   Allergen Reactions    Lactose intolerance [lactase]      All medications, allergies, and past history have been reviewed.    Objective:      Vitals:      5/3/2024     7:26 AM 4/8/2025     1:12 PM 4/24/2025     9:49 AM   Vitals - 1 value per visit   SYSTOLIC 124     DIASTOLIC 82     Pulse 58     Resp 18     SPO2 99 %     Weight (lb) 250 251.55 250.88   Weight (kg) 113.4 114.1 113.8   Height 5' 11" (1.803 m) 5' 11" (1.803 m) 5' 11" (1.803 m)   BMI (Calculated) 34.9 35.1 35   Pain Score Zero Five Zero       Body surface area is 2.39 meters squared.    Physical Exam  Constitutional:       General: He is not in acute distress.     Appearance: Normal appearance. He is not ill-appearing.   HENT:      Head: Normocephalic and atraumatic.      Right Ear: Tympanic membrane, ear canal and external ear normal.      Left Ear: Tympanic membrane, ear canal and external ear normal.      Nose: Septal deviation (rightward) present.      Mouth/Throat:      Lips: Pink. No lesions.      Mouth: Mucous membranes are moist. No oral lesions.      Tongue: No lesions.      Palate: No lesions.      Pharynx: Oropharynx is clear. Uvula midline. No pharyngeal swelling, oropharyngeal exudate, posterior oropharyngeal erythema or uvula swelling.   Eyes:      General:         Right eye: No discharge.         Left eye: No discharge.      Extraocular Movements: Extraocular movements " intact.      Conjunctiva/sclera: Conjunctivae normal.   Pulmonary:      Effort: Pulmonary effort is normal.   Neurological:      General: No focal deficit present.      Mental Status: He is alert and oriented to person, place, and time. Mental status is at baseline.   Psychiatric:         Mood and Affect: Mood normal.         Behavior: Behavior normal.         Thought Content: Thought content normal.         Judgment: Judgment normal.         Labs:  WBC   Date Value Ref Range Status   04/26/2024 5.45 3.90 - 12.70 K/uL Final     Platelets   Date Value Ref Range Status   04/26/2024 157 150 - 450 K/uL Final     Creatinine   Date Value Ref Range Status   04/26/2024 1.3 0.5 - 1.4 mg/dL Final     TSH   Date Value Ref Range Status   10/22/2019 1.340 0.340 - 5.600 uIU/mL Final     Glucose   Date Value Ref Range Status   04/26/2024 106 70 - 110 mg/dL Final     Hemoglobin A1C   Date Value Ref Range Status   04/26/2024 5.2 4.0 - 5.6 % Final     Comment:     ADA Screening Guidelines:  5.7-6.4%  Consistent with prediabetes  >or=6.5%  Consistent with diabetes    High levels of fetal hemoglobin interfere with the HbA1C  assay. Heterozygous hemoglobin variants (HbS, HgC, etc)do  not significantly interfere with this assay.   However, presence of multiple variants may affect accuracy.         Audiogram Summary:None at present.  All lab results and imaging results have been reviewed.    Assessment:        ICD-10-CM ICD-9-CM   1. Otitic barotrauma, initial encounter  T70.0XXA 993.0     E902.9   2. FABIÁN (obstructive sleep apnea)  G47.33 327.23   3. Sinusitis, unspecified chronicity, unspecified location  J32.9 473.9   4. Bilateral hearing loss, unspecified hearing loss type  H91.93 389.9           Plan:      Otitic barotrauma, initial encounter  -Bilateral OM secondary to otitic barotrauma resolved. Continue flonase and astelin 1 SEN BID for 1 more month and then as needed for sinonasal issues.     FABIÁN (obstructive sleep apnea)  -      BiPAP/CPAP Titration ((Must have dx of FABIÁN from previous sleep study); Future  - Continue with planned pulmonology follow up.    Sinusitis, unspecified chronicity, unspecified location  -Sinusitis resolved.    Bilateral Hearing Loss  -Will get updated comprehensive audiogram at follow up in 1 month.      Follow up in 1 month with comprehensive audiogram.

## 2025-05-01 ENCOUNTER — LAB VISIT (OUTPATIENT)
Dept: LAB | Facility: HOSPITAL | Age: 60
End: 2025-05-01
Attending: STUDENT IN AN ORGANIZED HEALTH CARE EDUCATION/TRAINING PROGRAM
Payer: COMMERCIAL

## 2025-05-01 DIAGNOSIS — E78.2 MIXED HYPERLIPIDEMIA: ICD-10-CM

## 2025-05-01 DIAGNOSIS — D56.3 THALASSEMIA MINOR: ICD-10-CM

## 2025-05-01 DIAGNOSIS — Z12.5 ENCOUNTER FOR PROSTATE CANCER SCREENING: ICD-10-CM

## 2025-05-01 DIAGNOSIS — Z00.00 ROUTINE GENERAL MEDICAL EXAMINATION AT A HEALTH CARE FACILITY: ICD-10-CM

## 2025-05-01 LAB
ABSOLUTE EOSINOPHIL (OHS): 0.15 K/UL
ABSOLUTE MONOCYTE (OHS): 0.4 K/UL (ref 0.3–1)
ABSOLUTE NEUTROPHIL COUNT (OHS): 2.61 K/UL (ref 1.8–7.7)
ALBUMIN SERPL BCP-MCNC: 4 G/DL (ref 3.5–5.2)
ALP SERPL-CCNC: 79 UNIT/L (ref 40–150)
ALT SERPL W/O P-5'-P-CCNC: 38 UNIT/L (ref 10–44)
ANION GAP (OHS): 11 MMOL/L (ref 8–16)
AST SERPL-CCNC: 34 UNIT/L (ref 11–45)
BASOPHILS # BLD AUTO: 0.04 K/UL
BASOPHILS NFR BLD AUTO: 0.9 %
BILIRUB SERPL-MCNC: 0.9 MG/DL (ref 0.1–1)
BUN SERPL-MCNC: 20 MG/DL (ref 8–23)
CALCIUM SERPL-MCNC: 9.2 MG/DL (ref 8.7–10.5)
CHLORIDE SERPL-SCNC: 105 MMOL/L (ref 95–110)
CHOLEST SERPL-MCNC: 180 MG/DL (ref 120–199)
CHOLEST/HDLC SERPL: 6.4 {RATIO} (ref 2–5)
CO2 SERPL-SCNC: 25 MMOL/L (ref 23–29)
CREAT SERPL-MCNC: 1.2 MG/DL (ref 0.5–1.4)
EAG (OHS): 108 MG/DL (ref 68–131)
ERYTHROCYTE [DISTWIDTH] IN BLOOD BY AUTOMATED COUNT: 17.4 % (ref 11.5–14.5)
FERRITIN SERPL-MCNC: 256 NG/ML (ref 20–300)
GFR SERPLBLD CREATININE-BSD FMLA CKD-EPI: >60 ML/MIN/1.73/M2
GLUCOSE SERPL-MCNC: 117 MG/DL (ref 70–110)
HBA1C MFR BLD: 5.4 % (ref 4–5.6)
HCT VFR BLD AUTO: 45 % (ref 40–54)
HDLC SERPL-MCNC: 28 MG/DL (ref 40–75)
HDLC SERPL: 15.6 % (ref 20–50)
HGB BLD-MCNC: 13.9 GM/DL (ref 14–18)
IMM GRANULOCYTES # BLD AUTO: 0.01 K/UL (ref 0–0.04)
IMM GRANULOCYTES NFR BLD AUTO: 0.2 % (ref 0–0.5)
IRON SATN MFR SERPL: 28 % (ref 20–50)
IRON SERPL-MCNC: 98 UG/DL (ref 45–160)
LDLC SERPL CALC-MCNC: 111.4 MG/DL (ref 63–159)
LYMPHOCYTES # BLD AUTO: 1.47 K/UL (ref 1–4.8)
MCH RBC QN AUTO: 21.2 PG (ref 27–31)
MCHC RBC AUTO-ENTMCNC: 30.9 G/DL (ref 32–36)
MCV RBC AUTO: 69 FL (ref 82–98)
NONHDLC SERPL-MCNC: 152 MG/DL
NUCLEATED RBC (/100WBC) (OHS): 0 /100 WBC
PLATELET # BLD AUTO: 169 K/UL (ref 150–450)
PMV BLD AUTO: 10.2 FL (ref 9.2–12.9)
POTASSIUM SERPL-SCNC: 4.5 MMOL/L (ref 3.5–5.1)
PROT SERPL-MCNC: 6.9 GM/DL (ref 6–8.4)
PSA SERPL-MCNC: 2.5 NG/ML
RBC # BLD AUTO: 6.55 M/UL (ref 4.6–6.2)
RELATIVE EOSINOPHIL (OHS): 3.2 %
RELATIVE LYMPHOCYTE (OHS): 31.4 % (ref 18–48)
RELATIVE MONOCYTE (OHS): 8.5 % (ref 4–15)
RELATIVE NEUTROPHIL (OHS): 55.8 % (ref 38–73)
SODIUM SERPL-SCNC: 141 MMOL/L (ref 136–145)
TESTOST SERPL-MCNC: 477 NG/DL (ref 304–1227)
TIBC SERPL-MCNC: 352 UG/DL (ref 250–450)
TRANSFERRIN SERPL-MCNC: 238 MG/DL (ref 200–375)
TRIGL SERPL-MCNC: 203 MG/DL (ref 30–150)
WBC # BLD AUTO: 4.68 K/UL (ref 3.9–12.7)

## 2025-05-01 PROCEDURE — 85025 COMPLETE CBC W/AUTO DIFF WBC: CPT

## 2025-05-01 PROCEDURE — 84403 ASSAY OF TOTAL TESTOSTERONE: CPT

## 2025-05-01 PROCEDURE — 80061 LIPID PANEL: CPT

## 2025-05-01 PROCEDURE — 83540 ASSAY OF IRON: CPT

## 2025-05-01 PROCEDURE — 84153 ASSAY OF PSA TOTAL: CPT

## 2025-05-01 PROCEDURE — 80053 COMPREHEN METABOLIC PANEL: CPT

## 2025-05-01 PROCEDURE — 83036 HEMOGLOBIN GLYCOSYLATED A1C: CPT

## 2025-05-01 PROCEDURE — 82728 ASSAY OF FERRITIN: CPT

## 2025-05-01 PROCEDURE — 36415 COLL VENOUS BLD VENIPUNCTURE: CPT | Mod: PO

## 2025-05-02 ENCOUNTER — RESULTS FOLLOW-UP (OUTPATIENT)
Dept: FAMILY MEDICINE | Facility: CLINIC | Age: 60
End: 2025-05-02

## 2025-05-05 ENCOUNTER — TELEPHONE (OUTPATIENT)
Dept: PHARMACY | Facility: CLINIC | Age: 60
End: 2025-05-05
Payer: COMMERCIAL

## 2025-05-05 NOTE — TELEPHONE ENCOUNTER
[facility] Chart Review & Patient Outreach Details For Medication Adherence Project    Reason for Outreach Encounter: 3rd Party payor non-compliance report ([employer], [employer], C, etc)  2.  Patient Outreach Method: Corrupt [employer] message  3.   Medication in question: rosuvatatin   LAST FILLED: 10/20/24 for 90 day supply  Hyperlipidemia Medications              rosuvastatin (CRESTOR) 10 MG tablet Take 1 tablet (10 mg total) by mouth every evening.               4.  Reviewed and or Updates Made To: Patient Chart  5. Outreach Outcomes and/or actions taken: Sent inquiry to patient: Waiting for response.

## 2025-05-07 NOTE — PROGRESS NOTES
[facility]  Office Visit Note     SUBJECTIVE:   HPI: Enrico Talbot  is a 60 y.o. male   Here for annual  Last annual May 2024     Due  - vaccines  - colonoscopy 7/31/2025   - cholesterol medication -- compliance?     History of Present Illness    CHIEF COMPLAINT:  Patient presents today for follow up of lab results    LABS:  Triglycerides increased to 208, LDL increased to 113 from previous value of 34 while on medication, and HDL decreased.     CARDIOVASCULAR HISTORY:  He has history of myocardial infarction with stent placement in circumflex artery. Subsequent cardiac testing showed no other arterial blockages. He denies experiencing any recent difficulty breathing or chest discomfort.    DIET AND EXERCISE:  He maintains a clean diet, avoiding fried foods and red meat, having not consumed hamburgers or french fries in 6 years. He drinks green tea and avoids sugar-containing sodas. He monitors caloric intake to 2100 calories daily. He exercises 4 days per week, performing more than 61 pushups, and deadlifts close to 500 lbs twice weekly. He performs approximately 3 miles of activity daily.    WEIGHT MANAGEMENT:  He reports current weight of 250 lbs. He had previously achieved weight in the 230s, reaching approximately 239 lbs.    TESTOSTERONE HISTORY:  He has history of testosterone replacement therapy at 1.0 weekly dose, which required blood draws every 6 weeks due to elevated hematocrit levels reaching 60. He discontinued testosterone therapy following the cardiac event.    REVIEW OF SYSTEMS:  He denies blood in stool, unintentional weight loss, black sticky stool, nausea, vomiting, constipation, or abdominal pain. He denies any history of pancreatitis. He denies personal or family history of medullary thyroid cancer or Multiple Endocrine Neoplasia Type 2 syndrome.         Lab Visit on 05/01/2025   Component Date Value Ref Range Status    Sodium 05/01/2025 141  136 - 145 mmol/L  Final    Potassium 05/01/2025 4.5  3.5 - 5.1 mmol/L Final    Chloride 05/01/2025 105  95 - 110 mmol/L Final    CO2 05/01/2025 25  23 - 29 mmol/L Final    Glucose 05/01/2025 117 (H)  70 - 110 mg/dL Final    BUN 05/01/2025 20  8 - 23 mg/dL Final    Creatinine 05/01/2025 1.2  0.5 - 1.4 mg/dL Final    Calcium 05/01/2025 9.2  8.7 - 10.5 mg/dL Final    Protein Total 05/01/2025 6.9  6.0 - 8.4 gm/dL Final    Albumin 05/01/2025 4.0  3.5 - 5.2 g/dL Final    Bilirubin Total 05/01/2025 0.9  0.1 - 1.0 mg/dL Final    ALP 05/01/2025 79  40 - 150 unit/L Final    AST 05/01/2025 34  11 - 45 unit/L Final    ALT 05/01/2025 38  10 - 44 unit/L Final    Anion Gap 05/01/2025 11  8 - 16 mmol/L Final    eGFR 05/01/2025 >60  >60 mL/min/1.73/m2 Final    Hemoglobin A1c 05/01/2025 5.4  4.0 - 5.6 % Final    Estimated Average Glucose 05/01/2025 108  68 - 131 mg/dL Final    Cholesterol Total 05/01/2025 180  120 - 199 mg/dL Final    Triglyceride 05/01/2025 203 (H)  30 - 150 mg/dL Final    HDL Cholesterol 05/01/2025 28 (L)  40 - 75 mg/dL Final    LDL Cholesterol 05/01/2025 111.4  63.0 - 159.0 mg/dL Final    HDL/Cholesterol Ratio 05/01/2025 15.6 (L)  20.0 - 50.0 % Final    Cholesterol/HDL Ratio 05/01/2025 6.4 (H)  2.0 - 5.0 Final    Non HDL Cholesterol 05/01/2025 152  mg/dL Final    Iron Level 05/01/2025 98  45 - 160 ug/dL Final    Transferrin 05/01/2025 238  200 - 375 mg/dL Final    Iron Binding Capacity Total 05/01/2025 352  250 - 450 ug/dL Final    Iron Saturation 05/01/2025 28  20 - 50 % Final    Ferritin 05/01/2025 256.0  20.0 - 300.0 ng/mL Final    Prostate Specific Antigen 05/01/2025 2.50  <=4.00 ng/mL Final    Testosterone Total 05/01/2025 477  304 - 1,227 ng/dL Final    WBC 05/01/2025 4.68  3.90 - 12.70 K/uL Final    RBC 05/01/2025 6.55 (H)  4.60 - 6.20 M/uL Final    HGB 05/01/2025 13.9 (L)  14.0 - 18.0 gm/dL Final    HCT 05/01/2025 45.0  40.0 - 54.0 % Final    MCV 05/01/2025 69 (L)  82 - 98 fL Final    MCH 05/01/2025 21.2 (L)  27.0 - 31.0 pg  Final    MCHC 05/01/2025 30.9 (L)  32.0 - 36.0 g/dL Final    RDW 05/01/2025 17.4 (H)  11.5 - 14.5 % Final    Platelet Count 05/01/2025 169  150 - 450 K/uL Final    MPV 05/01/2025 10.2  9.2 - 12.9 fL Final    Nucleated RBC 05/01/2025 0  <=0 /100 WBC Final    Neut % 05/01/2025 55.8  38 - 73 % Final    Lymph % 05/01/2025 31.4  18 - 48 % Final    Mono % 05/01/2025 8.5  4 - 15 % Final    Eos % 05/01/2025 3.2  <=8 % Final    Basophil % 05/01/2025 0.9  <=1.9 % Final    Imm Grans % 05/01/2025 0.2  0.0 - 0.5 % Final    Neut # 05/01/2025 2.61  1.8 - 7.7 K/uL Final    Lymph # 05/01/2025 1.47  1 - 4.8 K/uL Final    Mono # 05/01/2025 0.40  0.3 - 1 K/uL Final    Eos # 05/01/2025 0.15  <=0.5 K/uL Final    Baso # 05/01/2025 0.04  <=0.2 K/uL Final    Imm Grans # 05/01/2025 0.01  0.00 - 0.04 K/uL Final         Medications Ordered Prior to Encounter[1]  Past Medical History:   Diagnosis Date    Anemia     AR (allergic rhinitis)     Hyperlipidemia     Hypertension      Past Surgical History:   Procedure Laterality Date    CORONARY ANGIOGRAPHY Right 10/21/2019    Procedure: Left heart cath and possible PCI;  Surgeon: Scooby Mejia MD;  Location: [facility] CATH/EP LAB;  Service: Cardiology;  Laterality: Right;    EYE SURGERY  1988    Lasik    LEFT HEART CATHETERIZATION Left 10/21/2019    Procedure: Left heart cath;  Surgeon: Scooby Mejia MD;  Location: [facility] CATH/EP LAB;  Service: Cardiology;  Laterality: Left;    REFRACTIVE SURGERY  01/01/1998    SINUS SURGERY  01/01/1995     Past Surgical History:   Procedure Laterality Date    CORONARY ANGIOGRAPHY Right 10/21/2019    Procedure: Left heart cath and possible PCI;  Surgeon: Scooby Mejia MD;  Location: [facility] CATH/EP LAB;  Service: Cardiology;  Laterality: Right;    EYE SURGERY  1988    Lasik    LEFT HEART CATHETERIZATION Left 10/21/2019    Procedure: Left heart cath;  Surgeon: Scooby Mejia MD;  Location: [facility] CATH/EP LAB;  Service: Cardiology;  Laterality: Left;    REFRACTIVE  "SURGERY  01/01/1998    SINUS SURGERY  01/01/1995     Family History   Problem Relation Name Age of Onset    Dementia Mother      Heart disease Father Enrico Talbot         first MI at 63    Heart failure Father Enrico Talbot        Marital Status:   Alcohol History:  reports no history of alcohol use.  Tobacco History:  reports that he has never smoked. He has never been exposed to tobacco smoke. He has never used smokeless tobacco.  Drug History:  reports no history of drug use.    Health Maintenance Topics with due status: Not Due       Topic Last Completion Date    TETANUS VACCINE 08/17/2023    PROSTATE-SPECIFIC ANTIGEN 05/01/2025    Hemoglobin A1c (Diabetic Prevention Screening) 05/01/2025    Lipid Panel 05/01/2025    High Dose Statin 05/08/2025    Influenza Vaccine Not Due     Immunization History   Administered Date(s) Administered    Tdap 03/21/2019, 03/21/2019, 08/17/2023    Tetanus 08/12/2009       Review of patient's allergies indicates:   Allergen Reactions    Lactose intolerance [lactase]      Current Medications[2]    Review of Systems   Constitutional:  Negative for chills, fatigue and fever.   Respiratory:  Negative for shortness of breath.    Cardiovascular:  Negative for chest pain.   Gastrointestinal:  Negative for abdominal pain, nausea and vomiting.   Musculoskeletal:  Negative for gait problem.       OBJECTIVE:      Vitals:    05/08/25 0741   BP: 128/82   BP Location: Right arm   Patient Position: Sitting   Pulse: 67   SpO2: 99%   Weight: 113.9 kg (251 lb 1.7 oz)   Height: 5' 11" (1.803 m)     Physical Exam  Constitutional:       General: He is not in acute distress.     Appearance: He is obese. He is not ill-appearing or toxic-appearing.   HENT:      Head: Normocephalic and atraumatic.      Mouth/Throat:      Mouth: Mucous membranes are moist.      Pharynx: Uvula midline. No pharyngeal swelling.   Cardiovascular:      Rate and Rhythm: Normal rate and regular rhythm. "   Pulmonary:      Effort: Pulmonary effort is normal. No tachypnea, bradypnea, accessory muscle usage, prolonged expiration or respiratory distress.      Breath sounds: Normal breath sounds. No stridor. No wheezing, rhonchi or rales.   Abdominal:      General: Bowel sounds are normal. There is no distension.      Palpations: Abdomen is soft.      Tenderness: There is no abdominal tenderness. There is no guarding or rebound.   Neurological:      General: No focal deficit present.      Mental Status: He is alert.   Psychiatric:         Mood and Affect: Mood normal.         Behavior: Behavior normal.        Assessment:       1. Routine general medical examination at a health care facility    2. Mixed hyperlipidemia    3. Thalassemia minor    4. S/P coronary artery stent placement    5. Encounter for screening for malignant neoplasm of colon    6. Essential hypertension    7. BMI 35.0-35.9,adult    8. Weight loss counseling, encounter for        Plan:       Routine general medical examination at a health care facility  - Labs are up to date  - Colon cancer screening due this summer     Mixed hyperlipidemia  -     Lipid Panel; Future; Expected date: 05/08/2025  -     MYC E-VISIT  - Explained importance of maintaining LDL levels below 100, aiming for < 50 level, especially given history of stent placement.  - Reviewed recent lab results showing elevated triglycerides (208) and LDL (113) after temporary discontinuation of rosuvastatin (Crestor), compared to previous LDL of approximately 34 with medication.  - HDL has also decreased.  - Despite patient's efforts with diet (keeping under 2100 calories daily) and exercise (4 days a week, including deadlifting close to 500 lbs twice weekly and performing 3 miles of activities daily), medication is necessary for cholesterol management.  - Restarted rosuvastatin (Crestor) at previous dose after reviewing lab results.    Thalassemia minor  - Stable  - Continue to monitor     S/P  coronary artery stent placement  - Considered history and current cardiovascular risk factors.  - BP control and hyperlipidemia needed  - Keep up with healthy diet and exercise     Encounter for screening for malignant neoplasm of colon  -     Case Request Endoscopy: COLONOSCOPY    Essential hypertension  - BP stable  - Continue with the current treatment with lisinopril and metoprolol     BMI 35.0-35.9,adult  -     tirzepatide, weight loss, (ZEPBOUND) 2.5 mg/0.5 mL PnIj; Inject 2.5 mg into the skin every 7 days.  Dispense: 2 mL; Refill: 2    Weight loss counseling, encounter for  -     tirzepatide, weight loss, (ZEPBOUND) 2.5 mg/0.5 mL PnIj; Inject 2.5 mg into the skin every 7 days.  Dispense: 2 mL; Refill: 2  - Evaluated request for GLP-1 agonist for weight loss, considering current weight loss efforts and medical history.  - Initiated insurance approval process for GLP-1 agonist, planning to start with lowest dose and titrate as tolerated.  - Discussed mechanism of action including effects on appetite, gastric emptying, and potential for weight loss.  - Patient to continue current diet and exercise regimen.  - Patient denies any contraindications to GLP-1 agonist including constipation, nausea, history of pancreatitis, history of MEN2 syndrome in him or family, or history of medullary thyroid cancer in him or in his family    Counseled on age and gender appropriate medical preventative services, including cancer screenings, immunizations, overall nutritional health, need for a consistent exercise regimen and an overall push towards maintaining a vigorous and active lifestyle.      Follow up  - 1 month e-visit for HLD and weight management  - 4 month in person visit       Olena Trinidad M.D.  5/8/2025    This note was created using [employer] voice recognition software that occasionally misinterprets phrases or words            [1]   Current Outpatient Medications on File Prior to Visit   Medication Sig Dispense Refill     aspirin (ECOTRIN) 81 MG EC tablet Take 1 tablet (81 mg total) by mouth once daily.  0    azelastine (ASTELIN) 137 mcg (0.1 %) nasal spray 1 spray (137 mcg total) by Nasal route 2 (two) times daily. Point up and slightly outward toward ear when spraying to avoid irritating nasal septum. 30 mL 2    fluticasone propionate (FLONASE) 50 mcg/actuation nasal spray 1 spray (50 mcg total) by Each Nostril route 2 (two) times daily. Point up and slightly outward toward ear when spraying to avoid irritating nasal septum. 16 g 2    lisinopriL (PRINIVIL,ZESTRIL) 2.5 MG tablet TAKE 1 TABLET(2.5 MG) BY MOUTH TWICE DAILY 180 tablet 3    metoprolol succinate (TOPROL-XL) 25 MG 24 hr tablet Take 1 tablet (25 mg total) by mouth once daily. 90 tablet 3    MV-MN/FA/LYCOPENE/LUT/HB#178 (LILLI MULTIVITAMIN FOR MEN ORAL) Take 2 tablets by mouth 2 (two) times a day.      rosuvastatin (CRESTOR) 10 MG tablet Take 1 tablet (10 mg total) by mouth every evening. 90 tablet 3     No current facility-administered medications on file prior to visit.   [2]   Current Outpatient Medications:     aspirin (ECOTRIN) 81 MG EC tablet, Take 1 tablet (81 mg total) by mouth once daily., Disp: , Rfl: 0    azelastine (ASTELIN) 137 mcg (0.1 %) nasal spray, 1 spray (137 mcg total) by Nasal route 2 (two) times daily. Point up and slightly outward toward ear when spraying to avoid irritating nasal septum., Disp: 30 mL, Rfl: 2    fluticasone propionate (FLONASE) 50 mcg/actuation nasal spray, 1 spray (50 mcg total) by Each Nostril route 2 (two) times daily. Point up and slightly outward toward ear when spraying to avoid irritating nasal septum., Disp: 16 g, Rfl: 2    lisinopriL (PRINIVIL,ZESTRIL) 2.5 MG tablet, TAKE 1 TABLET(2.5 MG) BY MOUTH TWICE DAILY, Disp: 180 tablet, Rfl: 3    metoprolol succinate (TOPROL-XL) 25 MG 24 hr tablet, Take 1 tablet (25 mg total) by mouth once daily., Disp: 90 tablet, Rfl: 3    MV-MN/FA/LYCOPENE/LUT/HB#178 (LILLI MULTIVITAMIN FOR MEN  ORAL), Take 2 tablets by mouth 2 (two) times a day., Disp: , Rfl:     rosuvastatin (CRESTOR) 10 MG tablet, Take 1 tablet (10 mg total) by mouth every evening., Disp: 90 tablet, Rfl: 3    tirzepatide, weight loss, (ZEPBOUND) 2.5 mg/0.5 mL PnIj, Inject 2.5 mg into the skin every 7 days., Disp: 2 mL, Rfl: 2

## 2025-05-08 ENCOUNTER — OFFICE VISIT (OUTPATIENT)
Dept: FAMILY MEDICINE | Facility: CLINIC | Age: 60
End: 2025-05-08
Payer: COMMERCIAL

## 2025-05-08 VITALS
HEART RATE: 67 BPM | OXYGEN SATURATION: 99 % | WEIGHT: 251.13 LBS | SYSTOLIC BLOOD PRESSURE: 128 MMHG | DIASTOLIC BLOOD PRESSURE: 82 MMHG | HEIGHT: 71 IN | BODY MASS INDEX: 35.16 KG/M2

## 2025-05-08 DIAGNOSIS — E78.2 MIXED HYPERLIPIDEMIA: ICD-10-CM

## 2025-05-08 DIAGNOSIS — Z00.00 ROUTINE GENERAL MEDICAL EXAMINATION AT A HEALTH CARE FACILITY: Primary | ICD-10-CM

## 2025-05-08 DIAGNOSIS — I10 ESSENTIAL HYPERTENSION: ICD-10-CM

## 2025-05-08 DIAGNOSIS — D56.3 THALASSEMIA MINOR: ICD-10-CM

## 2025-05-08 DIAGNOSIS — Z71.3 WEIGHT LOSS COUNSELING, ENCOUNTER FOR: ICD-10-CM

## 2025-05-08 DIAGNOSIS — Z95.5 S/P CORONARY ARTERY STENT PLACEMENT: ICD-10-CM

## 2025-05-08 DIAGNOSIS — Z12.11 ENCOUNTER FOR SCREENING FOR MALIGNANT NEOPLASM OF COLON: ICD-10-CM

## 2025-05-08 PROCEDURE — 1159F MED LIST DOCD IN RCRD: CPT | Mod: CPTII,S$GLB,, | Performed by: STUDENT IN AN ORGANIZED HEALTH CARE EDUCATION/TRAINING PROGRAM

## 2025-05-08 PROCEDURE — 3044F HG A1C LEVEL LT 7.0%: CPT | Mod: CPTII,S$GLB,, | Performed by: STUDENT IN AN ORGANIZED HEALTH CARE EDUCATION/TRAINING PROGRAM

## 2025-05-08 PROCEDURE — 3079F DIAST BP 80-89 MM HG: CPT | Mod: CPTII,S$GLB,, | Performed by: STUDENT IN AN ORGANIZED HEALTH CARE EDUCATION/TRAINING PROGRAM

## 2025-05-08 PROCEDURE — 1160F RVW MEDS BY RX/DR IN RCRD: CPT | Mod: CPTII,S$GLB,, | Performed by: STUDENT IN AN ORGANIZED HEALTH CARE EDUCATION/TRAINING PROGRAM

## 2025-05-08 PROCEDURE — 99396 PREV VISIT EST AGE 40-64: CPT | Mod: S$GLB,,, | Performed by: STUDENT IN AN ORGANIZED HEALTH CARE EDUCATION/TRAINING PROGRAM

## 2025-05-08 PROCEDURE — 3074F SYST BP LT 130 MM HG: CPT | Mod: CPTII,S$GLB,, | Performed by: STUDENT IN AN ORGANIZED HEALTH CARE EDUCATION/TRAINING PROGRAM

## 2025-05-08 PROCEDURE — 4010F ACE/ARB THERAPY RXD/TAKEN: CPT | Mod: CPTII,S$GLB,, | Performed by: STUDENT IN AN ORGANIZED HEALTH CARE EDUCATION/TRAINING PROGRAM

## 2025-05-08 PROCEDURE — 99999 PR PBB SHADOW E&M-EST. PATIENT-LVL III: CPT | Mod: PBBFAC,,, | Performed by: STUDENT IN AN ORGANIZED HEALTH CARE EDUCATION/TRAINING PROGRAM

## 2025-05-08 PROCEDURE — 3008F BODY MASS INDEX DOCD: CPT | Mod: CPTII,S$GLB,, | Performed by: STUDENT IN AN ORGANIZED HEALTH CARE EDUCATION/TRAINING PROGRAM

## 2025-05-08 RX ORDER — TIRZEPATIDE 2.5 MG/.5ML
2.5 INJECTION, SOLUTION SUBCUTANEOUS
Qty: 2 ML | Refills: 2 | Status: SHIPPED | OUTPATIENT
Start: 2025-05-08

## 2025-06-01 ENCOUNTER — TELEPHONE (OUTPATIENT)
Dept: PHARMACY | Facility: CLINIC | Age: 60
End: 2025-06-01
Payer: COMMERCIAL

## 2025-06-08 NOTE — TELEPHONE ENCOUNTER
Mora Trinidad, Olena Grant MD,  my name is Justina Cintron and I am a pharmacist with [facility]. Part of my job as a pharmacist at [facility] is to perform medication therapy management which includes reviewing the reports that [facility] sends us that show the dates of the last time any diabetic, cholesterol and certain high blood pressure medications were filled and checking with the patients to see if they are in need of any refills to be sent to their pharmacy.     Mr. Talbot states they would like this prescription refilled so I have pended it for your review and approval. Our goal is to make sure patients stay adherent and do not miss any days of therapy due to delayed refills. Please review and take action as you see fit. Thank you.

## 2025-06-09 RX ORDER — ROSUVASTATIN CALCIUM 10 MG/1
10 TABLET, COATED ORAL NIGHTLY
Qty: 90 TABLET | Refills: 3 | Status: SHIPPED | OUTPATIENT
Start: 2025-06-09 | End: 2026-06-09

## 2025-06-12 ENCOUNTER — E-VISIT (OUTPATIENT)
Dept: FAMILY MEDICINE | Facility: CLINIC | Age: 60
End: 2025-06-12
Payer: COMMERCIAL

## 2025-06-12 DIAGNOSIS — E78.2 MIXED HYPERLIPIDEMIA: Primary | ICD-10-CM

## 2025-06-12 PROCEDURE — 99421 OL DIG E/M SVC 5-10 MIN: CPT | Mod: ,,, | Performed by: STUDENT IN AN ORGANIZED HEALTH CARE EDUCATION/TRAINING PROGRAM

## 2025-06-18 ENCOUNTER — PATIENT MESSAGE (OUTPATIENT)
Dept: GASTROENTEROLOGY | Facility: CLINIC | Age: 60
End: 2025-06-18
Payer: COMMERCIAL

## 2025-06-19 NOTE — PROGRESS NOTES
Patient ID: Enrico Talbot is a 60 y.o. male.    Chief Complaint: Hyperlipidemia (Entered automatically based on patient selection in [facility].)    The patient initiated a request through [facility] on 6/12/2025 for evaluation and management with a chief complaint of Hyperlipidemia (Entered automatically based on patient selection in [facility].)     I evaluated the questionnaire submission on 6/18/2025.    [facility] Peq Evisit Hyperlipidemia    6/18/2025 10:04 AM CDT - Filed by Patient   Do you agree to participate in an E-Visit? Yes   If you have any of the following symptoms, please present to your local emergency room or call 911:  I acknowledge   Choose the state of your primary residence [place]   What is the main issue you would like addressed today? ?   Have you exercised in the past month? Yes   What type of exercise? weight lifting   How many days in a week? 5   How many minutes do you exericse? 30 min   About how often do you eat fast food? Rarely or never   Which of these foods do you snack on most often? Fruits or vegetables;  I rarely eat snacks   Are you following any specific diet ? Yes   What diet are you following? no red meat, (grilled chicken , fish or turkey) grilled food only, no fried foods   How often do you drink the following? Once a week   I would like to address: Medication for high cholesterol or high triglycerides   Do you want to address a new or existing medication? Address a current medication   Would you like to change or continue your medication? Continue medication   What is the name of the medication you would like to continue? rouvastatin   Are you taking your medication as prescribed? Yes    What medical condition is the  medication intended to treat? lipid problems   Has the medication helped your condition? Yes   Have you experienced any side effects from the medication? No   Provide any additional information you feel is important.    Please attach any relevant images or files     Are you able to take your vital signs? Yes   Systolic Blood Pressure: 121   Diastolic Blood Pressure: 78   Weight: 250   Height: 71   Pulse: 60   Temperature: 98.6   Respiration rate:    Pulse Oxygen: 98         Encounter Diagnosis   Name Primary?    Mixed hyperlipidemia Yes        Continue with rosuvastatin as ASCVD is still elevated at 14.7%      E-Visit Time Tracking:    Day 1 Time (in minutes): 5    Total Time (in minutes): 5

## 2025-07-03 ENCOUNTER — OFFICE VISIT (OUTPATIENT)
Dept: PULMONOLOGY | Facility: CLINIC | Age: 60
End: 2025-07-03
Payer: COMMERCIAL

## 2025-07-03 VITALS
HEART RATE: 64 BPM | DIASTOLIC BLOOD PRESSURE: 81 MMHG | BODY MASS INDEX: 35.91 KG/M2 | HEIGHT: 71 IN | SYSTOLIC BLOOD PRESSURE: 135 MMHG | WEIGHT: 256.5 LBS | OXYGEN SATURATION: 98 %

## 2025-07-03 DIAGNOSIS — G47.33 OSA (OBSTRUCTIVE SLEEP APNEA): Primary | ICD-10-CM

## 2025-07-03 DIAGNOSIS — E66.01 SEVERE OBESITY (BMI 35.0-39.9) WITH COMORBIDITY: ICD-10-CM

## 2025-07-03 PROCEDURE — 99999 PR PBB SHADOW E&M-EST. PATIENT-LVL III: CPT | Mod: PBBFAC,,, | Performed by: NURSE PRACTITIONER

## 2025-07-03 NOTE — PROGRESS NOTES
"7/3/2025    Enrico Talbot  New Patient Consult    Chief Complaint   Patient presents with    Sleep Apnea       HPI:  07/03/2025:  In office today alone.  Denies history of lung disease. Denies current use of inhaler therapy, supplemental oxygen. Denies personal history of cancer, PE, current anticoagulation use.  Former patient of Dr. Rosas for management of FABIÁN - was initially diagnosed with FABIÁN after undergoing PSG revealing AHI 43.4 with desaturation to 72% on RA. Then underwent Titration study revealing the need for CPAP at 9-16 of pressure. States he has used CPAP faithfully over the last five years or so - with great reported benefit such as better quality sleep, improved Testosterone and BP, less daytime fatigue.   Over the last few months he notices that the machine is not putting out adequate pressures - states "pressure is not strong enough" and states he is "not getting enough air with use." Also states the machine is now making a loud noise which is new. States he is now experiencing more daytime fatigue, dry mouth.   Currently using nasal mask - using [facility] for CPAP supplies.   Denies shortness of breath, wheezing, cough, chest tightness, mucous production.   Compliance report reviewed from dates 04/03/2025 - 07/01/2025:  Usage > = 4 hours 72%  APAP 9-16  AHI 0.8  90% pressure 10           Social Hx: Lives with family - one dog and one cat in the home. 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, Smoking Hx: Never Smoker  Family Hx: No Lung Cancer, COPD, Asthma  Medical Hx: No previous pneumonia ; No previous shoulder/chest surgery        The Chief Complaint is: New to me      PFSH:  Past Medical History:   Diagnosis Date    Anemia     AR (allergic rhinitis)     Hyperlipidemia     Hypertension          Past Surgical History:   Procedure Laterality Date    CORONARY ANGIOGRAPHY Right 10/21/2019    Procedure: Left heart cath and possible PCI;  Surgeon: Scooby Mejia MD;  Location: " [facility] [place] CATH/EP LAB;  Service: Cardiology;  Laterality: Right;    EYE SURGERY  1988    Lasik    LEFT HEART CATHETERIZATION Left 10/21/2019    Procedure: Left heart cath;  Surgeon: Scooby Mejia MD;  Location: [facility] [place] CATH/EP LAB;  Service: Cardiology;  Laterality: Left;    REFRACTIVE SURGERY  01/01/1998    SINUS SURGERY  01/01/1995     Social History[1]  Family History   Problem Relation Name Age of Onset    Dementia Mother      Heart disease Father Enrico Talbot         first MI at 63    Heart failure Father Enrico Talbot      Review of patient's allergies indicates:   Allergen Reactions    Lactose intolerance [lactase]          I have reviewed past medical, family, and social history.     Performance Status:The patient's activity level is regular exercise.        Review of Systems   Constitutional:  Positive for fatigue. Negative for activity change, appetite change, chills, diaphoresis, fever and unexpected weight change.   HENT:  Negative for congestion, postnasal drip, rhinorrhea, sinus pressure, sinus pain, sore throat and trouble swallowing.    Eyes:  Negative for photophobia and visual disturbance.   Respiratory:  Negative for cough, choking, chest tightness, shortness of breath and wheezing.    Cardiovascular:  Negative for chest pain, palpitations and leg swelling.   Gastrointestinal:  Negative for abdominal distention, abdominal pain, blood in stool, constipation, diarrhea, nausea and vomiting.   Genitourinary:  Negative for difficulty urinating, dysuria, flank pain and hematuria.   Musculoskeletal:  Negative for back pain, gait problem, joint swelling and neck pain.   Skin:  Negative for rash and wound.   Allergic/Immunologic: Positive for food allergies. Negative for environmental allergies.   Neurological:  Negative for dizziness, seizures, syncope, weakness, light-headedness, numbness and headaches.   Hematological:  Does not bruise/bleed easily.   Psychiatric/Behavioral:  Positive for  "sleep disturbance. Negative for confusion. The patient is not nervous/anxious.          Exam:Comprehensive exam done. /81 (BP Location: Right arm, Patient Position: Sitting)   Pulse 64   Ht 5' 11" (1.803 m)   Wt 116.3 kg (256 lb 8.1 oz)   SpO2 98% Comment: on room air at rest  BMI 35.78 kg/m²   Exam included Vitals as listed  Constitutional: He is oriented to person, place, and time. He appears well-developed. No distress.   Nose: Nose normal.   Mouth/Throat: Uvula is midline, oropharynx is clear and moist and mucous membranes are normal. No dental caries. No oropharyngeal exudate, posterior oropharyngeal edema, posterior oropharyngeal erythema or tonsillar abscesses.    Eyes: Pupils are equal, round, and reactive to light.   Neck: No JVD present. No thyromegaly present.   Cardiovascular: Normal rate, regular rhythm and normal heart sounds. Exam reveals no gallop and no friction rub.   No murmur heard.  Pulmonary/Chest: Effort normal and breath sounds normal. No accessory muscle usage or stridor. No apnea and no tachypnea. No respiratory distress, decreased breath sounds, wheezes, rhonchi, rales, or tenderness.   Abdominal: Soft. He exhibits no mass. There is no tenderness. No hepatosplenomegaly, hernias and normoactive bowel sounds  Musculoskeletal: Normal range of motion. exhibits no edema.   Neurological:  alert and oriented to person, place, and time. not disoriented.   Skin: Skin is warm and dry. Capillary refill takes less 2 sec. No cyanosis or erythema. No pallor. Nails show no clubbing.   Psychiatric: normal mood and affect. behavior is normal. Judgment and thought content normal.       Radiographs (ct chest and cxr) reviewed: was not done  Patient imaging studies were reviewed and interpreted independently. My personal interpretation of most recent images include:  CXR -   CT Chest -       Labs Patient's labs were reviewed including CBC and CMP    Lab Results   Component Value Date    WBC 4.68 " 05/01/2025    RBC 6.55 (H) 05/01/2025    HGB 13.9 (L) 05/01/2025    HCT 45.0 05/01/2025    MCV 69 (L) 05/01/2025    MCH 21.2 (L) 05/01/2025    MCHC 30.9 (L) 05/01/2025    RDW 17.4 (H) 05/01/2025     05/01/2025    MPV 10.2 05/01/2025    GRAN 3.2 04/26/2024    GRAN 59.5 04/26/2024    LYMPH 31.4 05/01/2025    LYMPH 1.47 05/01/2025    MONO 8.5 05/01/2025    MONO 0.40 05/01/2025    EOS 3.2 05/01/2025    EOS 0.15 05/01/2025    BASO 0.04 04/26/2024    EOSINOPHIL 1.8 04/26/2024    BASOPHIL 0.9 05/01/2025    BASOPHIL 0.04 05/01/2025   CMP  Sodium   Date Value Ref Range Status   05/01/2025 141 136 - 145 mmol/L Final   04/26/2024 142 136 - 145 mmol/L Final     Potassium   Date Value Ref Range Status   05/01/2025 4.5 3.5 - 5.1 mmol/L Final   04/26/2024 4.6 3.5 - 5.1 mmol/L Final     Chloride   Date Value Ref Range Status   05/01/2025 105 95 - 110 mmol/L Final   04/26/2024 106 95 - 110 mmol/L Final     CO2   Date Value Ref Range Status   05/01/2025 25 23 - 29 mmol/L Final   04/26/2024 27 23 - 29 mmol/L Final     Glucose   Date Value Ref Range Status   05/01/2025 117 (H) 70 - 110 mg/dL Final   04/26/2024 106 70 - 110 mg/dL Final     BUN   Date Value Ref Range Status   05/01/2025 20 8 - 23 mg/dL Final     Creatinine   Date Value Ref Range Status   05/01/2025 1.2 0.5 - 1.4 mg/dL Final     Calcium   Date Value Ref Range Status   05/01/2025 9.2 8.7 - 10.5 mg/dL Final   04/26/2024 9.5 8.7 - 10.5 mg/dL Final     Protein Total   Date Value Ref Range Status   05/01/2025 6.9 6.0 - 8.4 gm/dL Final     Total Protein   Date Value Ref Range Status   04/26/2024 6.9 6.0 - 8.4 g/dL Final     Albumin   Date Value Ref Range Status   05/01/2025 4.0 3.5 - 5.2 g/dL Final   04/26/2024 4.2 3.5 - 5.2 g/dL Final   05/19/2014 4.4 3.6 - 5.1 g/dL Final     Comment:     @ Test Performed By:  [facility]  Carlin Doan M.D., FCAP.,   [id number] [place] [id number]  [facility] #[id number]       Total  Bilirubin   Date Value Ref Range Status   04/26/2024 1.2 (H) 0.1 - 1.0 mg/dL Final     Comment:     For infants and newborns, interpretation of results should be based  on gestational age, weight and in agreement with clinical  observations.    Premature Infant recommended reference ranges:  Up to 24 hours.............<8.0 mg/dL  Up to 48 hours............<12.0 mg/dL  3-5 days..................<15.0 mg/dL  6-29 days.................<15.0 mg/dL       Bilirubin Total   Date Value Ref Range Status   05/01/2025 0.9 0.1 - 1.0 mg/dL Final     Comment:     For infants and newborns, interpretation of results should be based   on gestational age, weight and in agreement with clinical   observations.    Premature Infant recommended reference ranges:   0-24 hours:  <8.0 mg/dL   24-48 hours: <12.0 mg/dL   3-5 days:    <15.0 mg/dL   6-29 days:   <15.0 mg/dL     Alkaline Phosphatase   Date Value Ref Range Status   04/26/2024 81 55 - 135 U/L Final     ALP   Date Value Ref Range Status   05/01/2025 79 40 - 150 unit/L Final     AST   Date Value Ref Range Status   05/01/2025 34 11 - 45 unit/L Final   04/26/2024 39 10 - 40 U/L Final     ALT   Date Value Ref Range Status   05/01/2025 38 10 - 44 unit/L Final   04/26/2024 35 10 - 44 U/L Final     Anion Gap   Date Value Ref Range Status   05/01/2025 11 8 - 16 mmol/L Final     eGFR   Date Value Ref Range Status   05/01/2025 >60 >60 mL/min/1.73/m2 Final     Comment:     Estimated GFR calculated using the CKD-EPI creatinine (2021) equation.   04/26/2024 >60.0 >60 mL/min/1.73 m^2 Final         PFT was not done  Pulmonary Functions Testing Results:        Plan:  Clinical impression is apparently straight forward and impression with management as below.    Enrico was seen today for sleep apnea.    Diagnoses and all orders for this visit:    FABIÁN (obstructive sleep apnea)  -     CPAP FOR HOME USE    Severe obesity (BMI 35.0-39.9) with comorbidity        Follow up in about 1 year (around  7/3/2026), or if symptoms worsen or fail to improve.    Discussed with patient above for education the following:      Patient Instructions   CPAP compliance report reviewed - shows great compliance. Does not necessarily show a gross need for titration study to be done at this time.     Will change APAP pressures from 9-16 to 12-20 when you bring your machine in.    In the meantime, continue CPAP nightly.    You are also due for a new CPAP machine at this time, one has been ordered.    CPAP supply order sent to [facility] - should be good for one year.    Continue current prescription medication regiment. Keep follow up appointment as scheduled. Please call the office if you have any questions or concerns.            [1]   Social History  Tobacco Use    Smoking status: Never     Passive exposure: Never    Smokeless tobacco: Never   Substance Use Topics    Alcohol use: No     Comment: QUIT 1985    Drug use: No

## 2025-07-03 NOTE — PATIENT INSTRUCTIONS
CPAP compliance report reviewed - shows great compliance. Does not necessarily show a gross need for titration study to be done at this time.     Will change APAP pressures from 9-16 to 12-20 when you bring your machine in.    In the meantime, continue CPAP nightly.    You are also due for a new CPAP machine at this time, one has been ordered.    CPAP supply order sent to [facility] - should be good for one year.    Continue current prescription medication regiment. Keep follow up appointment as scheduled. Please call the office if you have any questions or concerns.

## 2025-07-08 ENCOUNTER — TELEPHONE (OUTPATIENT)
Dept: PHARMACY | Facility: CLINIC | Age: 60
End: 2025-07-08
Payer: COMMERCIAL

## 2025-07-08 NOTE — TELEPHONE ENCOUNTER
[facility]/Population Health Chart Review & Patient Outreach Details For Medication Adherence Project    Reason for Outreach Encounter: 3rd Party payor non-compliance report ([employer], [employer], [employer], etc)  2.  Patient Outreach Method: Reviewed patient chart   3.   Medication in question:    Hyperlipidemia Medications              rosuvastatin (CRESTOR) 10 MG tablet Take 1 tablet (10 mg total) by mouth every evening.                  rosuvastatin   last filled  6/26/25 for 90 day supply    4.  Reviewed and or Updates Made To: Patient Chart  5. Outreach Outcomes and/or actions taken: Patient filled medication and is on track to be adherent  Additional Notes:

## 2025-07-15 ENCOUNTER — TELEPHONE (OUTPATIENT)
Dept: GASTROENTEROLOGY | Facility: CLINIC | Age: 60
End: 2025-07-15
Payer: COMMERCIAL

## 2025-07-15 NOTE — TELEPHONE ENCOUNTER
Call placed to Mr. Weston in regards to message received. Colonoscopy rescheduled to Friday 8/8 per his request. No further issues noted.

## 2025-07-15 NOTE — TELEPHONE ENCOUNTER
Copied from CRM #[id number]. Topic: General Inquiry - Patient Advice  >> Jul 15, 2025 10:05 AM Colleen wrote:  Type:  Needs Medical Advice    Who Called: pt  Would the patient rather a call back or a response via [facility]? Call back  Best Call Back Number: [phone]   Additional Information: would like to reschedule procedure and if poss reschedule on either a Tuesday or Friday

## 2025-08-08 ENCOUNTER — HOSPITAL ENCOUNTER (OUTPATIENT)
Facility: HOSPITAL | Age: 60
Discharge: HOME | End: 2025-08-08
Attending: INTERNAL MEDICINE | Admitting: INTERNAL MEDICINE
Payer: COMMERCIAL

## 2025-08-08 ENCOUNTER — ANESTHESIA (OUTPATIENT)
Dept: ENDOSCOPY | Facility: HOSPITAL | Age: 60
End: 2025-08-08
Payer: COMMERCIAL

## 2025-08-08 ENCOUNTER — ANESTHESIA EVENT (OUTPATIENT)
Dept: ENDOSCOPY | Facility: HOSPITAL | Age: 60
End: 2025-08-08
Payer: COMMERCIAL

## 2025-08-08 DIAGNOSIS — Z12.11 SCREENING FOR COLON CANCER: ICD-10-CM

## 2025-08-08 PROCEDURE — 63600175 PHARM REV CODE 636 W HCPCS: Performed by: NURSE ANESTHETIST, CERTIFIED REGISTERED

## 2025-08-08 PROCEDURE — 37000008 HC ANESTHESIA 1ST 15 MINUTES: Performed by: INTERNAL MEDICINE

## 2025-08-08 PROCEDURE — 37000009 HC ANESTHESIA EA ADD 15 MINS: Performed by: INTERNAL MEDICINE

## 2025-08-08 PROCEDURE — G0121 COLON CA SCRN NOT HI RSK IND: HCPCS | Performed by: INTERNAL MEDICINE

## 2025-08-08 PROCEDURE — G0121 COLON CA SCRN NOT HI RSK IND: HCPCS | Mod: ,,, | Performed by: INTERNAL MEDICINE

## 2025-08-08 PROCEDURE — 25000003 PHARM REV CODE 250: Performed by: INTERNAL MEDICINE

## 2025-08-08 RX ORDER — LIDOCAINE HYDROCHLORIDE 20 MG/ML
INJECTION INTRAVENOUS
Status: DISCONTINUED | OUTPATIENT
Start: 2025-08-08 | End: 2025-08-08

## 2025-08-08 RX ORDER — PROPOFOL 10 MG/ML
VIAL (ML) INTRAVENOUS
Status: DISCONTINUED | OUTPATIENT
Start: 2025-08-08 | End: 2025-08-08

## 2025-08-08 RX ORDER — SODIUM CHLORIDE 9 MG/ML
INJECTION, SOLUTION INTRAVENOUS CONTINUOUS
Status: DISCONTINUED | OUTPATIENT
Start: 2025-08-08 | End: 2025-08-08 | Stop reason: HOSPADM

## 2025-08-08 RX ADMIN — PROPOFOL 50 MG: 10 INJECTION, EMULSION INTRAVENOUS at 12:08

## 2025-08-08 RX ADMIN — PROPOFOL 100 MG: 10 INJECTION, EMULSION INTRAVENOUS at 12:08

## 2025-08-08 RX ADMIN — LIDOCAINE HYDROCHLORIDE 100 MG: 20 INJECTION, SOLUTION INTRAVENOUS at 12:08

## 2025-08-08 RX ADMIN — SODIUM CHLORIDE: 9 INJECTION, SOLUTION INTRAVENOUS at 11:08

## 2025-08-08 NOTE — ANESTHESIA PREPROCEDURE EVALUATION
08/08/2025  Enrico Talbot is a 60 y.o., male.      Pre-op Assessment          Review of Systems  Cardiovascular:     Hypertension  Past MI CAD                    Coronary Artery Disease:          Hx of Myocardial Infarction                  Hypertension         Pulmonary:        Sleep Apnea     Obstructive Sleep Apnea (FABIÁN).               Physical Exam  General: Well nourished        Anesthesia Plan  Type of Anesthesia, risks & benefits discussed:    Anesthesia Type: Gen Natural Airway  Intra-op Monitoring Plan: Standard ASA Monitors  Induction:  IV  Informed Consent: Informed consent signed with the Patient and all parties understand the risks and agree with anesthesia plan.  All questions answered.   ASA Score: 3  Day of Surgery Review of History & Physical: H&P Update referred to the surgeon/provider.    Ready For Surgery From Anesthesia Perspective.     .

## 2025-08-08 NOTE — TRANSFER OF CARE
"Anesthesia Transfer of Care Note    Patient: Enrico Talbot    Procedure(s) Performed: Procedure(s) (LRB):  COLONOSCOPY, SCREENING, LOW RISK PATIENT (N/A)    Patient location: GI    Anesthesia Type: general    Transport from OR: Transported from OR on room air with adequate spontaneous ventilation    Post pain: adequate analgesia    Post assessment: no apparent anesthetic complications    Post vital signs: stable    Level of consciousness: sedated    Nausea/Vomiting: no nausea/vomiting    Complications: none    Transfer of care protocol was followed      Last vitals: Visit Vitals  BP (!) 117/56 (BP Location: Left arm, Patient Position: Lying)   Pulse (!) 59   Temp 36.5 °C (97.7 °F) (Skin)   Resp 13   Ht 5' 11" (1.803 m)   Wt 113.4 kg (250 lb)   SpO2 96%   BMI 34.87 kg/m²     "

## 2025-08-08 NOTE — ANESTHESIA POSTPROCEDURE EVALUATION
Anesthesia Post Evaluation    Patient: Enrico Talbot    Procedure(s) Performed: Procedure(s) (LRB):  COLONOSCOPY, SCREENING, LOW RISK PATIENT (N/A)    Final Anesthesia Type: general      Patient location during evaluation: PACU  Patient participation: Yes- Able to Participate  Level of consciousness: awake and alert and oriented  Post-procedure vital signs: reviewed and stable  Pain management: adequate  Airway patency: patent    PONV status at discharge: No PONV  Anesthetic complications: no      Cardiovascular status: blood pressure returned to baseline  Respiratory status: unassisted, spontaneous ventilation and room air  Hydration status: euvolemic  Follow-up not needed.              Vitals Value Taken Time   /73 08/08/25 13:00   Temp 36.5 °C (97.7 °F) 08/08/25 12:44   Pulse 60 08/08/25 13:02   Resp 17 08/08/25 13:02   SpO2 96 % 08/08/25 13:02   Vitals shown include unfiled device data.      No case tracking events are documented in the log.      Pain/Brock Score: Brock Score: 10 (8/8/2025  1:00 PM)

## 2025-08-11 VITALS
OXYGEN SATURATION: 97 % | TEMPERATURE: 98 F | BODY MASS INDEX: 35 KG/M2 | WEIGHT: 250 LBS | RESPIRATION RATE: 14 BRPM | HEIGHT: 71 IN | SYSTOLIC BLOOD PRESSURE: 149 MMHG | HEART RATE: 58 BPM | DIASTOLIC BLOOD PRESSURE: 73 MMHG

## 2025-08-13 ENCOUNTER — OFFICE VISIT (OUTPATIENT)
Dept: URGENT CARE | Facility: CLINIC | Age: 60
End: 2025-08-13
Payer: COMMERCIAL

## 2025-08-13 VITALS
WEIGHT: 250 LBS | OXYGEN SATURATION: 99 % | TEMPERATURE: 98 F | SYSTOLIC BLOOD PRESSURE: 155 MMHG | BODY MASS INDEX: 35 KG/M2 | DIASTOLIC BLOOD PRESSURE: 88 MMHG | RESPIRATION RATE: 20 BRPM | HEIGHT: 71 IN | HEART RATE: 85 BPM

## 2025-08-13 DIAGNOSIS — M54.50 ACUTE LOW BACK PAIN, UNSPECIFIED BACK PAIN LATERALITY, UNSPECIFIED WHETHER SCIATICA PRESENT: Primary | ICD-10-CM

## 2025-08-13 DIAGNOSIS — S61.411A LACERATION OF PALM WITHOUT COMPLICATION, RIGHT, INITIAL ENCOUNTER: ICD-10-CM

## 2025-08-13 DIAGNOSIS — T14.8XXA PUNCTURE WOUND: ICD-10-CM

## 2025-08-13 PROCEDURE — 12041 INTMD RPR N-HF/GENIT 2.5CM/<: CPT | Mod: 59,S$GLB,,

## 2025-08-13 PROCEDURE — 99214 OFFICE O/P EST MOD 30 MIN: CPT | Mod: 25,S$GLB,,

## 2025-08-13 PROCEDURE — 12031 INTMD RPR S/A/T/EXT 2.5 CM/<: CPT | Mod: 59,S$GLB,,

## 2025-08-13 PROCEDURE — 73552 X-RAY EXAM OF FEMUR 2/>: CPT | Mod: LT,S$GLB,, | Performed by: RADIOLOGY

## 2025-08-13 PROCEDURE — 72100 X-RAY EXAM L-S SPINE 2/3 VWS: CPT | Mod: S$GLB,,, | Performed by: RADIOLOGY

## 2025-08-13 RX ORDER — CEPHALEXIN 500 MG/1
500 CAPSULE ORAL EVERY 6 HOURS
Qty: 20 CAPSULE | Refills: 0 | Status: SHIPPED | OUTPATIENT
Start: 2025-08-13 | End: 2025-08-18

## 2025-08-13 RX ORDER — MUPIROCIN 20 MG/G
OINTMENT TOPICAL 2 TIMES DAILY
Qty: 15 G | Refills: 0 | Status: SHIPPED | OUTPATIENT
Start: 2025-08-13 | End: 2025-08-20

## 2025-08-13 RX ORDER — HYDROCODONE BITARTRATE AND ACETAMINOPHEN 5; 325 MG/1; MG/1
1 TABLET ORAL EVERY 6 HOURS PRN
Qty: 12 TABLET | Refills: 0 | Status: SHIPPED | OUTPATIENT
Start: 2025-08-13

## 2025-08-19 ENCOUNTER — OFFICE VISIT (OUTPATIENT)
Dept: URGENT CARE | Facility: CLINIC | Age: 60
End: 2025-08-19
Payer: COMMERCIAL

## 2025-08-19 VITALS
DIASTOLIC BLOOD PRESSURE: 85 MMHG | RESPIRATION RATE: 17 BRPM | BODY MASS INDEX: 35 KG/M2 | WEIGHT: 250 LBS | HEIGHT: 71 IN | HEART RATE: 64 BPM | SYSTOLIC BLOOD PRESSURE: 147 MMHG | OXYGEN SATURATION: 98 % | TEMPERATURE: 97 F

## 2025-08-19 DIAGNOSIS — S31.821D: Primary | ICD-10-CM

## 2025-08-19 DIAGNOSIS — Z48.02 ENCOUNTER FOR REMOVAL OF SUTURES: ICD-10-CM

## 2025-08-19 DIAGNOSIS — Z51.89 ENCOUNTER FOR WOUND RE-CHECK: ICD-10-CM

## 2025-08-25 DIAGNOSIS — Z95.5 S/P CORONARY ARTERY STENT PLACEMENT: Primary | ICD-10-CM

## 2025-08-26 RX ORDER — METOPROLOL SUCCINATE 25 MG/1
25 TABLET, EXTENDED RELEASE ORAL DAILY
Qty: 37 TABLET | Refills: 0 | Status: SHIPPED | OUTPATIENT
Start: 2025-08-26 | End: 2025-08-26

## 2025-08-26 RX ORDER — METOPROLOL SUCCINATE 25 MG/1
25 TABLET, EXTENDED RELEASE ORAL DAILY
Qty: 30 TABLET | Refills: 1 | Status: SHIPPED | OUTPATIENT
Start: 2025-08-26 | End: 2026-08-26

## 2025-08-26 RX ORDER — LISINOPRIL 2.5 MG/1
2.5 TABLET ORAL 2 TIMES DAILY
Qty: 180 TABLET | Refills: 3 | OUTPATIENT
Start: 2025-08-26

## 2025-08-27 ENCOUNTER — OFFICE VISIT (OUTPATIENT)
Dept: ORTHOPEDICS | Facility: CLINIC | Age: 60
End: 2025-08-27
Payer: COMMERCIAL

## 2025-08-27 ENCOUNTER — HOSPITAL ENCOUNTER (OUTPATIENT)
Dept: RADIOLOGY | Facility: HOSPITAL | Age: 60
Discharge: HOME | End: 2025-08-27
Attending: ORTHOPAEDIC SURGERY
Payer: COMMERCIAL

## 2025-08-27 VITALS — WEIGHT: 253 LBS | HEIGHT: 71 IN | BODY MASS INDEX: 35.42 KG/M2

## 2025-08-27 DIAGNOSIS — R52 PAIN: Primary | ICD-10-CM

## 2025-08-27 DIAGNOSIS — M54.2 CERVICALGIA: ICD-10-CM

## 2025-08-27 DIAGNOSIS — M48.02 SPINAL STENOSIS, CERVICAL REGION: ICD-10-CM

## 2025-08-27 PROCEDURE — 72040 X-RAY EXAM NECK SPINE 2-3 VW: CPT | Mod: 26,,, | Performed by: RADIOLOGY

## 2025-08-27 PROCEDURE — 72040 X-RAY EXAM NECK SPINE 2-3 VW: CPT | Mod: TC,PN

## 2025-08-27 PROCEDURE — 99999 PR PBB SHADOW E&M-EST. PATIENT-LVL III: CPT | Mod: PBBFAC,,, | Performed by: ORTHOPAEDIC SURGERY

## 2025-08-28 ENCOUNTER — LAB VISIT (OUTPATIENT)
Dept: LAB | Facility: HOSPITAL | Age: 60
End: 2025-08-28
Attending: STUDENT IN AN ORGANIZED HEALTH CARE EDUCATION/TRAINING PROGRAM
Payer: COMMERCIAL

## 2025-08-28 DIAGNOSIS — E78.2 MIXED HYPERLIPIDEMIA: ICD-10-CM

## 2025-08-28 LAB
CHOLEST SERPL-MCNC: 106 MG/DL (ref 120–199)
CHOLEST/HDLC SERPL: 3.5 {RATIO} (ref 2–5)
HDLC SERPL-MCNC: 30 MG/DL (ref 40–75)
HDLC SERPL: 28.3 % (ref 20–50)
LDLC SERPL CALC-MCNC: 47.8 MG/DL (ref 63–159)
NONHDLC SERPL-MCNC: 76 MG/DL
TRIGL SERPL-MCNC: 141 MG/DL (ref 30–150)

## 2025-08-28 PROCEDURE — 82465 ASSAY BLD/SERUM CHOLESTEROL: CPT

## 2025-08-28 PROCEDURE — 36415 COLL VENOUS BLD VENIPUNCTURE: CPT | Mod: PO

## 2025-09-04 ENCOUNTER — OFFICE VISIT (OUTPATIENT)
Dept: FAMILY MEDICINE | Facility: CLINIC | Age: 60
End: 2025-09-04
Payer: COMMERCIAL

## 2025-09-04 VITALS
HEIGHT: 71 IN | BODY MASS INDEX: 36.23 KG/M2 | SYSTOLIC BLOOD PRESSURE: 136 MMHG | WEIGHT: 258.81 LBS | OXYGEN SATURATION: 97 % | HEART RATE: 64 BPM | DIASTOLIC BLOOD PRESSURE: 88 MMHG

## 2025-09-04 DIAGNOSIS — Z95.5 S/P CORONARY ARTERY STENT PLACEMENT: ICD-10-CM

## 2025-09-04 DIAGNOSIS — D56.3 THALASSEMIA MINOR: ICD-10-CM

## 2025-09-04 DIAGNOSIS — I10 ESSENTIAL HYPERTENSION: ICD-10-CM

## 2025-09-04 DIAGNOSIS — Z71.3 WEIGHT LOSS COUNSELING, ENCOUNTER FOR: ICD-10-CM

## 2025-09-04 DIAGNOSIS — E78.2 MIXED HYPERLIPIDEMIA: Primary | ICD-10-CM

## 2025-09-04 PROCEDURE — 3079F DIAST BP 80-89 MM HG: CPT | Mod: CPTII,S$GLB,, | Performed by: STUDENT IN AN ORGANIZED HEALTH CARE EDUCATION/TRAINING PROGRAM

## 2025-09-04 PROCEDURE — 1159F MED LIST DOCD IN RCRD: CPT | Mod: CPTII,S$GLB,, | Performed by: STUDENT IN AN ORGANIZED HEALTH CARE EDUCATION/TRAINING PROGRAM

## 2025-09-04 PROCEDURE — 3008F BODY MASS INDEX DOCD: CPT | Mod: CPTII,S$GLB,, | Performed by: STUDENT IN AN ORGANIZED HEALTH CARE EDUCATION/TRAINING PROGRAM

## 2025-09-04 PROCEDURE — 3075F SYST BP GE 130 - 139MM HG: CPT | Mod: CPTII,S$GLB,, | Performed by: STUDENT IN AN ORGANIZED HEALTH CARE EDUCATION/TRAINING PROGRAM

## 2025-09-04 PROCEDURE — 99214 OFFICE O/P EST MOD 30 MIN: CPT | Mod: S$GLB,,, | Performed by: STUDENT IN AN ORGANIZED HEALTH CARE EDUCATION/TRAINING PROGRAM

## 2025-09-04 PROCEDURE — 4010F ACE/ARB THERAPY RXD/TAKEN: CPT | Mod: CPTII,S$GLB,, | Performed by: STUDENT IN AN ORGANIZED HEALTH CARE EDUCATION/TRAINING PROGRAM

## 2025-09-04 PROCEDURE — 99999 PR PBB SHADOW E&M-EST. PATIENT-LVL III: CPT | Mod: PBBFAC,,, | Performed by: STUDENT IN AN ORGANIZED HEALTH CARE EDUCATION/TRAINING PROGRAM

## 2025-09-04 PROCEDURE — G2211 COMPLEX E/M VISIT ADD ON: HCPCS | Mod: S$GLB,,, | Performed by: STUDENT IN AN ORGANIZED HEALTH CARE EDUCATION/TRAINING PROGRAM

## 2025-09-04 PROCEDURE — 1160F RVW MEDS BY RX/DR IN RCRD: CPT | Mod: CPTII,S$GLB,, | Performed by: STUDENT IN AN ORGANIZED HEALTH CARE EDUCATION/TRAINING PROGRAM

## 2025-09-04 PROCEDURE — 3044F HG A1C LEVEL LT 7.0%: CPT | Mod: CPTII,S$GLB,, | Performed by: STUDENT IN AN ORGANIZED HEALTH CARE EDUCATION/TRAINING PROGRAM

## 2025-09-04 RX ORDER — LOSARTAN POTASSIUM 25 MG/1
25 TABLET ORAL DAILY
Qty: 90 TABLET | Refills: 3 | Status: SHIPPED | OUTPATIENT
Start: 2025-09-04 | End: 2026-09-04

## (undated) DEVICE — CATHETER RADIAL TIG 4.0 OPTITORQUE 5X110

## (undated) DEVICE — GUIDEWIRE J TIP EXCHANGE FIXED CORE 260

## (undated) DEVICE — GUIDEWIRE J TIP BMW .014X190

## (undated) DEVICE — HEMOSTAT VASC BAND REG 24CM

## (undated) DEVICE — SHEATH INTRODUCER SLENDER 6FX10CM

## (undated) DEVICE — CATHETER BALLOON EUPHORA 2.00X15MM

## (undated) DEVICE — CATHETER GUIDE LAUNCHER EBU3.5 6X110